# Patient Record
Sex: MALE | Race: WHITE | Employment: FULL TIME | URBAN - METROPOLITAN AREA
[De-identification: names, ages, dates, MRNs, and addresses within clinical notes are randomized per-mention and may not be internally consistent; named-entity substitution may affect disease eponyms.]

---

## 2017-07-31 ENCOUNTER — HOSPITAL ENCOUNTER (EMERGENCY)
Age: 57
Discharge: HOME | End: 2017-07-31
Attending: EMERGENCY MEDICINE | Admitting: EMERGENCY MEDICINE
Payer: COMMERCIAL

## 2017-07-31 ENCOUNTER — APPOINTMENT (OUTPATIENT)
Dept: ULTRASOUND IMAGING | Age: 57
End: 2017-07-31
Attending: EMERGENCY MEDICINE
Payer: COMMERCIAL

## 2017-07-31 ENCOUNTER — APPOINTMENT (OUTPATIENT)
Dept: CT IMAGING | Age: 57
End: 2017-07-31
Attending: EMERGENCY MEDICINE
Payer: COMMERCIAL

## 2017-07-31 VITALS
BODY MASS INDEX: 27.62 KG/M2 | HEIGHT: 72 IN | HEART RATE: 66 BPM | WEIGHT: 203.93 LBS | SYSTOLIC BLOOD PRESSURE: 157 MMHG | RESPIRATION RATE: 16 BRPM | DIASTOLIC BLOOD PRESSURE: 79 MMHG | OXYGEN SATURATION: 100 % | TEMPERATURE: 98.1 F

## 2017-07-31 DIAGNOSIS — R10.84 ABDOMINAL PAIN, GENERALIZED: Primary | ICD-10-CM

## 2017-07-31 LAB
ALBUMIN SERPL BCP-MCNC: 4 G/DL (ref 3.5–5)
ALBUMIN/GLOB SERPL: 1 {RATIO} (ref 1.1–2.2)
ALP SERPL-CCNC: 99 U/L (ref 45–117)
ALT SERPL-CCNC: 37 U/L (ref 12–78)
ANION GAP BLD CALC-SCNC: 4 MMOL/L (ref 5–15)
APPEARANCE UR: CLEAR
AST SERPL W P-5'-P-CCNC: 22 U/L (ref 15–37)
BACTERIA URNS QL MICRO: NEGATIVE /HPF
BASOPHILS # BLD AUTO: 0 K/UL (ref 0–0.1)
BASOPHILS # BLD: 0 % (ref 0–1)
BILIRUB SERPL-MCNC: 0.5 MG/DL (ref 0.2–1)
BILIRUB UR QL: NEGATIVE
BUN SERPL-MCNC: 14 MG/DL (ref 6–20)
BUN/CREAT SERPL: 15 (ref 12–20)
CALCIUM SERPL-MCNC: 8.9 MG/DL (ref 8.5–10.1)
CHLORIDE SERPL-SCNC: 103 MMOL/L (ref 97–108)
CO2 SERPL-SCNC: 29 MMOL/L (ref 21–32)
COLOR UR: ABNORMAL
CREAT SERPL-MCNC: 0.93 MG/DL (ref 0.7–1.3)
EOSINOPHIL # BLD: 0 K/UL (ref 0–0.4)
EOSINOPHIL NFR BLD: 0 % (ref 0–7)
EPITH CASTS URNS QL MICRO: ABNORMAL /LPF
ERYTHROCYTE [DISTWIDTH] IN BLOOD BY AUTOMATED COUNT: 12.8 % (ref 11.5–14.5)
GLOBULIN SER CALC-MCNC: 4.2 G/DL (ref 2–4)
GLUCOSE SERPL-MCNC: 93 MG/DL (ref 65–100)
GLUCOSE UR STRIP.AUTO-MCNC: NEGATIVE MG/DL
HCT VFR BLD AUTO: 45.1 % (ref 36.6–50.3)
HGB BLD-MCNC: 15.8 G/DL (ref 12.1–17)
HGB UR QL STRIP: ABNORMAL
HYALINE CASTS URNS QL MICRO: ABNORMAL /LPF (ref 0–5)
KETONES UR QL STRIP.AUTO: NEGATIVE MG/DL
LEUKOCYTE ESTERASE UR QL STRIP.AUTO: NEGATIVE
LIPASE SERPL-CCNC: 251 U/L (ref 73–393)
LYMPHOCYTES # BLD AUTO: 24 % (ref 12–49)
LYMPHOCYTES # BLD: 3 K/UL (ref 0.8–3.5)
MCH RBC QN AUTO: 33.1 PG (ref 26–34)
MCHC RBC AUTO-ENTMCNC: 35 G/DL (ref 30–36.5)
MCV RBC AUTO: 94.5 FL (ref 80–99)
MONOCYTES # BLD: 0.7 K/UL (ref 0–1)
MONOCYTES NFR BLD AUTO: 6 % (ref 5–13)
NEUTS SEG # BLD: 8.5 K/UL (ref 1.8–8)
NEUTS SEG NFR BLD AUTO: 70 % (ref 32–75)
NITRITE UR QL STRIP.AUTO: NEGATIVE
PH UR STRIP: 6 [PH] (ref 5–8)
PLATELET # BLD AUTO: 282 K/UL (ref 150–400)
POTASSIUM SERPL-SCNC: 3.9 MMOL/L (ref 3.5–5.1)
PROT SERPL-MCNC: 8.2 G/DL (ref 6.4–8.2)
PROT UR STRIP-MCNC: NEGATIVE MG/DL
RBC # BLD AUTO: 4.77 M/UL (ref 4.1–5.7)
RBC #/AREA URNS HPF: ABNORMAL /HPF (ref 0–5)
SODIUM SERPL-SCNC: 136 MMOL/L (ref 136–145)
SP GR UR REFRACTOMETRY: 1.02 (ref 1–1.03)
UA: UC IF INDICATED,UAUC: ABNORMAL
UROBILINOGEN UR QL STRIP.AUTO: 0.2 EU/DL (ref 0.2–1)
WBC # BLD AUTO: 12.3 K/UL (ref 4.1–11.1)
WBC URNS QL MICRO: ABNORMAL /HPF (ref 0–4)

## 2017-07-31 PROCEDURE — 81001 URINALYSIS AUTO W/SCOPE: CPT | Performed by: EMERGENCY MEDICINE

## 2017-07-31 PROCEDURE — 36415 COLL VENOUS BLD VENIPUNCTURE: CPT | Performed by: EMERGENCY MEDICINE

## 2017-07-31 PROCEDURE — 99284 EMERGENCY DEPT VISIT MOD MDM: CPT

## 2017-07-31 PROCEDURE — 80053 COMPREHEN METABOLIC PANEL: CPT | Performed by: EMERGENCY MEDICINE

## 2017-07-31 PROCEDURE — 76705 ECHO EXAM OF ABDOMEN: CPT

## 2017-07-31 PROCEDURE — 83690 ASSAY OF LIPASE: CPT | Performed by: EMERGENCY MEDICINE

## 2017-07-31 PROCEDURE — 74176 CT ABD & PELVIS W/O CONTRAST: CPT

## 2017-07-31 PROCEDURE — 85025 COMPLETE CBC W/AUTO DIFF WBC: CPT | Performed by: EMERGENCY MEDICINE

## 2017-07-31 RX ORDER — ESCITALOPRAM OXALATE 10 MG/1
10 TABLET ORAL DAILY
COMMUNITY

## 2017-07-31 RX ORDER — DIAZEPAM 5 MG/1
5 TABLET ORAL
Status: DISCONTINUED | OUTPATIENT
Start: 2017-07-31 | End: 2017-08-01 | Stop reason: HOSPADM

## 2017-07-31 NOTE — ED NOTES
Pt LUCIANO in wheelchair with transport for US. Reporting pain has increased, but wants to wait until he gets back from [address] before requesting something for pain.

## 2017-07-31 NOTE — ED NOTES
Pt received to exam room started to experience a 8/10 sharp, stabbing pain since this morning. Denies taking anything OTC to help with pain. Has had some associated nausea. Denies vomiting. Denies any changes in bowel or bladder habits.

## 2017-07-31 NOTE — ED NOTES
Pt resting in stretcher. Call bell within reach. Pt updated on plan of care    Reporting currently pain is tolerable and will ring out if pain increases. Awaiting US.

## 2017-08-01 NOTE — DISCHARGE INSTRUCTIONS

## 2017-08-01 NOTE — ED NOTES
Discharge instructions reviewed with pt and given by Dr. Ekaterina Naranjo. Pt left prior to receiving valium. Ambulated out of ED with steady gait, accompanied by wife. No other complaints voiced at this time.

## 2017-08-01 NOTE — ED NOTES
Pt returned to room from [address]. Pain continues to be tolerable.      Awaiting reeval.

## 2017-08-01 NOTE — ED PROVIDER NOTES
HPI Comments: Annette Deluca is a 55 y.o. male who presents ambulatory to [address] [facility] ED with cc of persistent RUQ abdominal pain since approximately 0900 this morning. Pt states that pain has been constant since onset, but waxes and wanes in intensity with some associated nausea and decreased appetite secondary to symptoms. He notes exacerbation of pain upon movement or palpation of the area. Pt denies any episodes of similar symptoms in the past and states that current abdominal pain feels different than that of prior kidney stone symptoms. Pt endorses daily EtOH use described as \"4-6 beers a day. \" He denies any prior abdominal surgeries and specifically denies any diarrhea, vomiting, chest pain, cough, or fever. Mahesh Silva MD    PMHx: kidney stones  Social Hx: + smoking; + daily EtOH; - illicit drug use    There are no other complains, changes, or physical findings at this time. The history is provided by the patient. No  was used. Past Medical History:   Diagnosis Date    Psychiatric disorder     anxiety       Past Surgical History:   Procedure Laterality Date    HX ORTHOPAEDIC      right knee    HX OTHER SURGICAL      vasectomy         History reviewed. No pertinent family history. Social History     Social History    Marital status:      Spouse name: N/A    Number of children: N/A    Years of education: N/A     Occupational History    Not on file. Social History Main Topics    Smoking status: Current Every Day Smoker     Packs/day: 1.00    Smokeless tobacco: Never Used    Alcohol use 16.8 - 24.6 oz/week     28 - 41 Cans of beer per week    Drug use: No    Sexual activity: Not on file     Other Topics Concern    Not on file     Social History Narrative         ALLERGIES: Review of patient's allergies indicates no known allergies. Review of Systems   Constitutional: Positive for appetite change. Negative for chills, fatigue and fever. HENT: Negative. Negative for congestion, rhinorrhea, sinus pressure and sore throat. Eyes: Negative. Respiratory: Negative. Negative for cough, choking, chest tightness, shortness of breath and wheezing. Cardiovascular: Negative. Negative for chest pain, palpitations and leg swelling. Gastrointestinal: Positive for abdominal pain and nausea. Negative for constipation, diarrhea and vomiting. Endocrine: Negative. Genitourinary: Negative. Negative for difficulty urinating, dysuria, flank pain and urgency. Musculoskeletal: Negative. Skin: Negative. Neurological: Negative. Negative for dizziness, speech difficulty, weakness, light-headedness, numbness and headaches. Psychiatric/Behavioral: Negative. All other systems reviewed and are negative. Vitals:    07/31/17 1614 07/31/17 1715 07/31/17 1918 07/31/17 1920   BP: 178/83 (!) 173/95 157/79    Pulse: 64 66     Resp: 17 16     Temp: 98.1 °F (36.7 °C)      SpO2: 99% 97%  100%   Weight: 92.5 kg (203 lb 14.8 oz)      Height: 6' (1.829 m)               Physical Exam   Constitutional: He is oriented to person, place, and time. He appears well-developed and well-nourished. No distress. HENT:   Head: Normocephalic and atraumatic. Mouth/Throat: Oropharynx is clear and moist. No oropharyngeal exudate. Eyes: Conjunctivae and EOM are normal. Pupils are equal, round, and reactive to light. Neck: Normal range of motion. Neck supple. No JVD present. No tracheal deviation present. Cardiovascular: Normal rate, regular rhythm, normal heart sounds and intact distal pulses. No murmur heard. Pulmonary/Chest: Effort normal and breath sounds normal. No stridor. No respiratory distress. He has no wheezes. He has no rales. He exhibits no tenderness. Abdominal: Soft. He exhibits no distension. There is tenderness (right upper and mid abdomen, neg McBurney's). There is no rebound and no guarding. Musculoskeletal: Normal range of motion.  He exhibits no edema or tenderness. Neurological: He is alert and oriented to person, place, and time. No cranial nerve deficit. No gross motor or sensory deficits    Skin: Skin is warm and dry. He is not diaphoretic. Psychiatric: He has a normal mood and affect. His behavior is normal.   Nursing note and vitals reviewed.        MDM  Number of Diagnoses or Management Options  Diagnosis management comments: DDx: cholelithiasis, cholecystitis, choledocholithiasis, pancreatitis       Amount and/or Complexity of Data Reviewed  Clinical lab tests: ordered and reviewed  Tests in the radiology section of CPT®: ordered and reviewed  Review and summarize past medical records: yes    Patient Progress  Patient progress: stable    ED Course       Procedures    LABORATORY TESTS:  Recent Results (from the past 12 hour(s))   URINALYSIS W/ REFLEX CULTURE    Collection Time: 07/31/17  4:20 PM   Result Value Ref Range    Color YELLOW/STRAW      Appearance CLEAR CLEAR      Specific gravity 1.024 1.003 - 1.030      pH (UA) 6.0 5.0 - 8.0      Protein NEGATIVE  NEG mg/dL    Glucose NEGATIVE  NEG mg/dL    Ketone NEGATIVE  NEG mg/dL    Bilirubin NEGATIVE  NEG      Blood MODERATE (A) NEG      Urobilinogen 0.2 0.2 - 1.0 EU/dL    Nitrites NEGATIVE  NEG      Leukocyte Esterase NEGATIVE  NEG      WBC 0-4 0 - 4 /hpf    RBC 10-20 0 - 5 /hpf    Epithelial cells FEW FEW /lpf    Bacteria NEGATIVE  NEG /hpf    UA:UC IF INDICATED CULTURE NOT INDICATED BY UA RESULT CNI      Hyaline cast 0-2 0 - 5 /lpf   CBC WITH AUTOMATED DIFF    Collection Time: 07/31/17  5:57 PM   Result Value Ref Range    WBC 12.3 (H) 4.1 - 11.1 K/uL    RBC 4.77 4.10 - 5.70 M/uL    HGB 15.8 12.1 - 17.0 g/dL    HCT 45.1 36.6 - 50.3 %    MCV 94.5 80.0 - 99.0 FL    MCH 33.1 26.0 - 34.0 PG    MCHC 35.0 30.0 - 36.5 g/dL    RDW 12.8 11.5 - 14.5 %    PLATELET 721 024 - 493 K/uL    NEUTROPHILS 70 32 - 75 %    LYMPHOCYTES 24 12 - 49 %    MONOCYTES 6 5 - 13 %    EOSINOPHILS 0 0 - 7 % BASOPHILS 0 0 - 1 %    ABS. NEUTROPHILS 8.5 (H) 1.8 - 8.0 K/UL    ABS. LYMPHOCYTES 3.0 0.8 - 3.5 K/UL    ABS. MONOCYTES 0.7 0.0 - 1.0 K/UL    ABS. EOSINOPHILS 0.0 0.0 - 0.4 K/UL    ABS. BASOPHILS 0.0 0.0 - 0.1 K/UL   METABOLIC PANEL, COMPREHENSIVE    Collection Time: 17  5:57 PM   Result Value Ref Range    Sodium 136 136 - 145 mmol/L    Potassium 3.9 3.5 - 5.1 mmol/L    Chloride 103 97 - 108 mmol/L    CO2 29 21 - 32 mmol/L    Anion gap 4 (L) 5 - 15 mmol/L    Glucose 93 65 - 100 mg/dL    BUN 14 6 - 20 MG/DL    Creatinine 0.93 0.70 - 1.30 MG/DL    BUN/Creatinine ratio 15 12 - 20      GFR est AA >60 >60 ml/min/1.73m2    GFR est non-AA >60 >60 ml/min/1.73m2    Calcium 8.9 8.5 - 10.1 MG/DL    Bilirubin, total 0.5 0.2 - 1.0 MG/DL    ALT (SGPT) 37 12 - 78 U/L    AST (SGOT) 22 15 - 37 U/L    Alk. phosphatase 99 45 - 117 U/L    Protein, total 8.2 6.4 - 8.2 g/dL    Albumin 4.0 3.5 - 5.0 g/dL    Globulin 4.2 (H) 2.0 - 4.0 g/dL    A-G Ratio 1.0 (L) 1.1 - 2.2     LIPASE    Collection Time: 17  5:57 PM   Result Value Ref Range    Lipase 251 73 - 393 U/L       IMAGING RESULTS:  CT Results  (Last 48 hours)               17  CT ABD PELV WO CONT Final result    Narrative:  EXAM:  CT ABD PELV WO CONT       INDICATION:  Right flank, hematuria       COMPARISON: 2013. CONTRAST:  None. TECHNIQUE:    Unenhanced multislice helical CT was performed from the diaphragm to the   symphysis pubis without oral or intravenous contrast administration. Contiguous   5 mm axial images were reconstructed and lung and soft tissue windows were   generated. Coronal and sagittal reformations were generated. CT dose reduction   was achieved through use of a standardized protocol tailored for this   examination and automatic exposure control for dose modulation. FINDINGS:   LUN.7 mm nodule noted in the base of the right middle lobe unchanged.  Second   nodule in the right middle lobe more peripherally measuring 4.1 mm unchanged. Small nodule in the right lower lobe measures 4.0 mm. This is unchanged as   well. .       The absence of intravenous contrast material reduces the sensitivity for   evaluation of the solid parenchymal organs of the abdomen. LIVER: No focal lesion. GALLBLADDER: Unremarkable. SPLEEN: No focal lesion. PANCREAS: No focal lesion. ADRENALS: No focal lesion. KIDNEYS: No calculus. No hydronephrosis. GI: There is no evidence of bowel obstruction. Diverticulosis of the distal   colon is noted without diverticulitis. .   APPENDIX: Unremarkable. AORTA: The aorta tapers without aneurysm. RETROPERITONEUM:  There is no retroperitoneal adenopathy or mass. PERITONEUM: There is no ascites or free intraperitoneal air. BLADDER: Unremarkable   PELVIS: There is no pelvic mass or adenopathy. BONES: No sclerotic or lytic lesions noted. IMPRESSION:     1. No evidence of urinary tract calculus or obstruction. 2. Diverticulosis without diverticulitis. 3. No significant change in pulmonary nodules at the right lung base. VITALS:  Patient Vitals for the past 12 hrs:   Temp Pulse Resp BP SpO2   07/31/17 1920 - - - - 100 %   07/31/17 1918 - - - 157/79 -   07/31/17 1715 - 66 16 (!) 173/95 97 %   07/31/17 1614 98.1 °F (36.7 °C) 64 17 178/83 99 %       MEDICATIONS GIVEN:  Medications   diazePAM (VALIUM) tablet 5 mg (not administered)       IMPRESSION:  1. Abdominal pain, generalized        PLAN:  1. Current Discharge Medication List        2. Follow-up Information     Follow up With Details Comments [address] [facility] [place]   [address] [facility]  [phone]      [facility] EMERGENCY DEPT  If symptoms worsen [address] [facility]  [address] [facility]  [phone]        3. Return to ED if worse     Discharge Note:  10:53 PM  The patient has been re-evaluated and is ready for discharge. Reviewed available results with patient. Counseled patient on diagnosis and care plan. Patient has expressed understanding, and all questions have been answered. Patient agrees with plan and agrees to follow up as recommended, or to return to the ED if their symptoms worsen. Discharge instructions have been provided and explained to the patient, along with reasons to return to the ED. This note is prepared by Aldair Omer, acting as Scribe for [facility], [address] [place] [address]: The scribe's documentation has been prepared under my direction and personally reviewed by me in its entirety. I confirm that the note above accurately reflects all work, treatment, procedures, and medical decision making performed by me.

## 2022-01-13 ENCOUNTER — OFFICE VISIT (OUTPATIENT)
Dept: ORTHOPEDIC SURGERY | Age: 62
End: 2022-01-13
Payer: COMMERCIAL

## 2022-01-13 VITALS
HEIGHT: 72 IN | SYSTOLIC BLOOD PRESSURE: 143 MMHG | OXYGEN SATURATION: 97 % | TEMPERATURE: 98 F | HEART RATE: 71 BPM | DIASTOLIC BLOOD PRESSURE: 95 MMHG | WEIGHT: 204 LBS | BODY MASS INDEX: 27.63 KG/M2

## 2022-01-13 DIAGNOSIS — M16.12 UNILATERAL PRIMARY OSTEOARTHRITIS, LEFT HIP: Primary | ICD-10-CM

## 2022-01-13 DIAGNOSIS — M70.62 TROCHANTERIC BURSITIS, LEFT HIP: ICD-10-CM

## 2022-01-13 DIAGNOSIS — M54.16 LUMBAR RADICULOPATHY: ICD-10-CM

## 2022-01-13 PROCEDURE — 99204 OFFICE O/P NEW MOD 45 MIN: CPT | Performed by: ORTHOPAEDIC SURGERY

## 2022-01-13 PROCEDURE — 20610 DRAIN/INJ JOINT/BURSA W/O US: CPT | Performed by: ORTHOPAEDIC SURGERY

## 2022-01-13 RX ORDER — BETAMETHASONE SODIUM PHOSPHATE AND BETAMETHASONE ACETATE 3; 3 MG/ML; MG/ML
6 INJECTION, SUSPENSION INTRA-ARTICULAR; INTRALESIONAL; INTRAMUSCULAR; SOFT TISSUE ONCE
Status: COMPLETED | OUTPATIENT
Start: 2022-01-13 | End: 2022-01-13

## 2022-01-13 RX ADMIN — BETAMETHASONE SODIUM PHOSPHATE AND BETAMETHASONE ACETATE 6 MG: 3; 3 INJECTION, SUSPENSION INTRA-ARTICULAR; INTRALESIONAL; INTRAMUSCULAR; SOFT TISSUE at 12:08

## 2022-01-13 NOTE — LETTER
1/13/2022    Patient: Ariel Mendoza   YOB: 1960   Date of Visit: 1/13/2022     Lyndon Christopher, [address]  [address] [facility] [address]  Via Fax: [phone]    Dear Lyndon Christopher MD,      Thank you for referring Mr. Tamia Jo to [facility] for evaluation. My notes for this consultation are attached. If you have questions, please do not hesitate to call me. I look forward to following your patient along with you.       Sincerely,    Liset Byrd, DO

## 2022-01-13 NOTE — PROGRESS NOTES
1/13/2022    Assessment: Peritrochanteric pain syndrome, left hip, Osteoarthritis left hip, lumbar radiculopathy, left side    Plan: This patient and I did discuss at length the anatomy, which I drew out in office. We discussed the potential causes of the issue, as well as the treatment options, which are largely nonoperative. We discussed that a mixture of anti-inflammatory analgesics, cortisone injections, as well as consistent physical therapy for 4-6 weeks is the standard of care of this issue. Evidence indicates that over 90% of patients can begin to resolve their issues in that time. The patient will proceed with injections, PT, nsaids. When he returns, we will proceed with possible hip injection into the joint. The patient will then follow up in one weeks for a clinical check. Chief Complaint: Left hip pain    HPI: This is a 62 y.o. patient who complains of left hip pain which is activity dependent. The patient has had activity dependent pain for several months. The pain is located in the lateral aspect of the hip, it radiates somewhat distally and laterally, it is severe in intensity. He has a burning in his calf and bilateral feet. The patient complains of difficulty sleeping on the left side, limitation in the normal activities of daily living. The patient has tried activity modification, No over the counter pain medications, no physical therapy, injections have not been attempted. No other surgery or pertinent history to this hip. Past Medical History:   Diagnosis Date    Kidney stone     Psychiatric disorder     anxiety       Past Surgical History:   Procedure Laterality Date    HX ORTHOPAEDIC      right knee    HX OTHER SURGICAL      vasectomy       Current Outpatient Medications on File Prior to Visit   Medication Sig Dispense Refill    escitalopram oxalate (LEXAPRO) 10 mg tablet Take 10 mg by mouth daily.        No current facility-administered medications on file prior to visit. No Known Allergies    No family history on file. Social History     Socioeconomic History    Marital status:    Tobacco Use    Smoking status: Current Every Day Smoker     Packs/day: 1.00    Smokeless tobacco: Never Used   Substance and Sexual Activity    Alcohol use: Yes     Alcohol/week: 28.0 - 41.0 standard drinks     Types: 28 - 41 Cans of beer per week    Drug use: No         Review of Systems:       General: Denies headache, lethargy, fever, weight loss  Ears/Nose/Throat: Denies ear discharge, drainage, nosebleeds, hoarse voice, dental problems  Cardiovascular: Denies chest pain, shortness of breath  Lungs: Denies chest pain, breathing problems, wheezing, pneumonia  Stomach: Denies stomach pain, heartburn, constipation, irritable bowel  Skin: Denies rash, sores, open wounds  Musculoskeletal: Admits to lateral hip pain  Genitourinary: Denies dysuria, hematuria, polyuria  Gastrointestinal: Denies constipation, obstipation, diarrhea  Neurological: Denies changes in sight, smell, hearing, taste, seizures. Denies loss of consciousness.   Psychiatric: Denies depression, sleep pattern changes, anxiety, change in personality  Endocrine: Denies mood swings, heat or cold intolerance  Hematologic/Lymphatic: Denies anemia, purpura, petechia  Allergic/Immunologic: Denies swelling of throat, pain or swelling at lymph nodes      Physical Examination:    Visit Vitals  BP (!) 143/95 (BP 1 Location: Right arm, BP Patient Position: Sitting, BP Cuff Size: Large adult)   Pulse 71   Temp 98 °F (36.7 °C) (Tympanic)   Ht 6' (1.829 m)   Wt 204 lb (92.5 kg)   SpO2 97%   BMI 27.67 kg/m²        General: AOX3, no apparent distress  Psychiatric: mood and affect appropriate  Lungs: breathing is symmetric and unlabored bilaterally  Heart: regular rate and rhythm  Abdomen: no guarding  Head: normocephalic, atraumatic  Skin: No significant abnormalities, good turgor  Sensation intact to light touch: L1-S1 dermatomes  Muscular exam: 5/5 strength in all major muscle groups unless noted in specialty exam.    Extremities      Left upper extremity: Full active and passive range of motion without pain, deformity, no open wound, strength 5/5 in all major muscle groups. Right upper extremity: Full active and passive range of motion without pain, deformity, no open wound, strength 5/5 in all major muscle groups. Right lower extremity: Full active and passive range of motion without pain, deformity, no open wound, strength 5/5 in all major muscle groups. Left lower extremity:  No deformity is noted. Circumduction of the hip causes significant pain in the groin. Palpation of the abductors as well as lateral aspect of the hip at the peritrochanteric bursa region is painful and reproductive of the chief complaint. Range of motion is limited, with a positive impingement sign. ROMAN test is positive. Gait is reciprocal.   Sensation is intact to light touch in the L1-S1 dermatomes. Skin is intact about the hip. Hip flexion strength is 5/5 and abduction strength is 5/5, hip extension and knee extension as well as tibialis anterior and EHL/GCS are 5/5 strength. Diagnostics:    Pertinent Xrays:  Pelvis and hip xrays indicate no fractures, bone on bone OA of left hip, DJD spine, calcifcations at the tip of the greater trochanter at the insertion of the gluteus tendons. Mr. Eloise Webster has a reminder for a \"due or due soon\" health maintenance. I have asked that he contact his primary care provider for follow-up on this health maintenance. 1/13/2022  PROCEDURE NOTE: Left Hip Peritrochanteric Cortisone Injection    After consent was signed, the patient's left hip was prepped using a chlorhexidine scrub.   Once  sterile, a timeout was performed and a 22 gauge needle was used to inject 5cc of 1% lidocaine through the subcutaneous tissues and iliotibial band in the peritrochanteric area nearest the apex of tenderness. Once adequate anesthesia was confirmed, a mixture of 6mg of betamethasone and 5 cc of 1% lidocaine were injected below the iliotibial band in the same area. Needle was removed and a sterile dressing applied. Patient tolerated well without complication. Post injection instructions were given.

## 2022-01-13 NOTE — PROGRESS NOTES
Identified pt with two pt identifiers (name and ). Reviewed chart in preparation for visit and have obtained necessary documentation. Marjorie Abbott is a 62 y.o. male  Chief Complaint   Patient presents with    Hip Pain     LT hip     Visit Vitals  BP (!) 143/95 (BP 1 Location: Right arm, BP Patient Position: Sitting, BP Cuff Size: Large adult)   Pulse 71   Temp 98 °F (36.7 °C) (Tympanic)   Ht 6' (1.829 m)   Wt 204 lb (92.5 kg)   SpO2 97%   BMI 27.67 kg/m²     1. Have you been to the ER, urgent care clinic since your last visit? Hospitalized since your last visit? No    2. Have you seen or consulted any other health care providers outside of the [address] [place] [address] since your last visit? Include any pap smears or colon screening.  No

## 2022-01-20 ENCOUNTER — OFFICE VISIT (OUTPATIENT)
Dept: ORTHOPEDIC SURGERY | Age: 62
End: 2022-01-20
Payer: COMMERCIAL

## 2022-01-20 VITALS
DIASTOLIC BLOOD PRESSURE: 100 MMHG | HEIGHT: 72 IN | BODY MASS INDEX: 27.63 KG/M2 | OXYGEN SATURATION: 97 % | SYSTOLIC BLOOD PRESSURE: 153 MMHG | WEIGHT: 204 LBS | TEMPERATURE: 98.9 F | HEART RATE: 84 BPM

## 2022-01-20 DIAGNOSIS — M16.12 UNILATERAL PRIMARY OSTEOARTHRITIS, LEFT HIP: Primary | ICD-10-CM

## 2022-01-20 PROCEDURE — 99213 OFFICE O/P EST LOW 20 MIN: CPT | Performed by: ORTHOPAEDIC SURGERY

## 2022-01-20 NOTE — PROGRESS NOTES
Identified pt with two pt identifiers (name and ). Reviewed chart in preparation for visit and have obtained necessary documentation. Olinda Gary is a 62 y.o. male  Chief Complaint   Patient presents with    Follow-up     LT hip     Visit Vitals  BP (!) 153/100 (BP 1 Location: Right arm, BP Patient Position: Sitting, BP Cuff Size: Large adult)   Pulse 84   Temp 98.9 °F (37.2 °C) (Tympanic)   Ht 6' (1.829 m)   Wt 204 lb (92.5 kg)   SpO2 97%   BMI 27.67 kg/m²     1. Have you been to the ER, urgent care clinic since your last visit? Hospitalized since your last visit? No    2. Have you seen or consulted any other health care providers outside of the [address] [place] [address] since your last visit? Include any pap smears or colon screening.  No

## 2022-01-20 NOTE — PROGRESS NOTES
1/20/2022    Chief Complaint: Left hip pain    Assessment: Unilateral primary osteoarthritis of the left hip  (M16.12)     Plan: This patient and I did have a long discussion regarding the treatment options. Nonoperative management was discussed at length, continued pain control, physical therapy, injections, ambulatory aids, and weight loss. I did speak with the patient specifically that there are always some nonoperative options, and that surgery would be elective on their part. We did discuss the risks of surgery which include but are not limited to infection, nerve or blood vessel damage, femoral, lateral femoral cutaneous nerve injury, sciatic nerve injury, failure of fixation, failure of any possible implant, need for reoperation, postoperative pain and swelling, intra-or postoperative fracture, postoperative dislocation, leg length inequality, need for reoperation, implant failure, death, disability, organ dysfunction, wound healing issues, DVT, PE, and the need for further procedures. The patient did freely state their understanding and satisfaction with our discussion. We will proceed with a total hip arthroplasty after he talks with his family and medical clearances. He is aware that he does have a spine issue as well, this may be a combined issue that will require intervention after surgery. The patient was counseled at length about the risks of jayne Covid-19 during their perioperative period and any recovery window from their procedure. The patient was made aware that jayne Covid-19  may worsen their prognosis for recovering from their procedure and lend to a higher morbidity and/or mortality risk. All material risks, benefits, and reasonable alternatives including postponing the procedure were discussed. The patient does  wish to proceed with the procedure at this time. HPI: This is a 62 y.o.male who complains of left hip pain which is activity dependent.  The patient has had activity dependent pain for years. The patient has tried activity modification, he's tried physical therapy, injections have given minimal relief. The pain is in the groin, it is severe in intensity. The patient fears falling, walks with a limp, and feels as though all nonoperative management has failed. The patient denies any numbness, weakness, tingling, fevers, chills, nausea, vomiting, fevers, chills, nausea, vomiting. Past Medical History:   Diagnosis Date    Kidney stone     Psychiatric disorder     anxiety       Past Surgical History:   Procedure Laterality Date    HX ORTHOPAEDIC      right knee    HX OTHER SURGICAL      vasectomy       Current Outpatient Medications on File Prior to Visit   Medication Sig Dispense Refill    escitalopram oxalate (LEXAPRO) 10 mg tablet Take 10 mg by mouth daily. No current facility-administered medications on file prior to visit. No Known Allergies    No family history on file. Social History     Socioeconomic History    Marital status:    Tobacco Use    Smoking status: Current Every Day Smoker     Packs/day: 1.00    Smokeless tobacco: Never Used   Substance and Sexual Activity    Alcohol use: Yes     Alcohol/week: 28.0 - 41.0 standard drinks     Types: 28 - 41 Cans of beer per week    Drug use: No         Review of Systems:       General: Denies headache, lethargy, fever, weight loss  Ears/Nose/Throat: Denies ear discharge, drainage, nosebleeds, hoarse voice, dental problems  Cardiovascular: Denies chest pain, shortness of breath  Lungs: Denies chest pain, breathing problems, wheezing, pneumonia  Stomach: Denies stomach pain, heartburn, constipation, irritable bowel  Skin: Denies rash, sores, open wounds  Musculoskeletal: Admits to joint pain and swelling, this pain is sharp and causes difficulty walking.   This pain is in the groin on the left side, it is severe, made better by rest.   This pain causes a limp, admits to stiffness of the joint, decreased mobility, and  difficulty doing normal activities of daily living secondary to the pain. Genitourinary: Denies dysuria, hematuria, polyuria  Gastrointestinal: Denies constipation, obstipation, diarrhea  Neurological: Denies changes in sight, smell, hearing, taste, seizures. Denies loss of consciousness. Psychiatric: Denies depression, sleep pattern changes, anxiety, change in personality  Endocrine: Denies mood swings, heat or cold intolerance  Hematologic/Lymphatic: Denies anemia, purpura, petechia  Allergic/Immunologic: Denies swelling of throat, pain or swelling at lymph nodes      Physical Examination:      General: AOX3, no apparent distress  Psychiatric: mood and affect appropriate  Lungs: clear to auscultation bilaterally  Heart: regular rate and rhythm  Abdomen: no guarding  Head: normocephalic, atraumatic  Skin: No significant abnormalities, good turgor  Sensation intact to light touch: L1-S1 dermatomes  Muscular exam: 5/5 strength in all major muscle groups unless noted in specialty exam.    Extremities      Left upper extremity: Full active and passive range of motion without pain, deformity, no open wound, strength 5/5 in all major muscle groups. Right upper extremity: Full active and passive range of motion without pain, deformity, no open wound, strength 5/5 in all major muscle groups. Right lower extremity: Full active and passive range of motion without pain, deformity, no open wound, strength 5/5 in all major muscle groups. Left lower extremity:  No deformity is noted. Circumduction of the hip causes significant pain in the groin, reproductive of the chief complaint. Range of motion is limited, with a positive impingement sign. ROMAN test causes some pain in the groin. Gait is antalgic. Slight tenderness to palpation on the lateral aspect of the hip. Sensation is intact to light touch in the L1-S1 dermatomes. Skin is intact about the hip.   Hip flexion and abduction strength is 4+/5, hip extension and knee extension as well as tibialis anterior and EHL/GCS are 5/5 strength. Diagnostics:    Pertinent Xrays:  Pelvis and hip xrays indicate severe osteoarthritis of the left hip. There are osteophytes at the femoral neck and head, as well as subchondral sclerosis of the acetabular rim. Mr. Mendoza has a reminder for a \"due or due soon\" health maintenance. I have asked that he contact his primary care provider for follow-up on this health maintenance.

## 2022-02-09 ENCOUNTER — OFFICE VISIT (OUTPATIENT)
Dept: ORTHOPEDIC SURGERY | Age: 62
End: 2022-02-09
Payer: COMMERCIAL

## 2022-02-09 VITALS
HEIGHT: 72 IN | SYSTOLIC BLOOD PRESSURE: 171 MMHG | OXYGEN SATURATION: 97 % | HEART RATE: 75 BPM | DIASTOLIC BLOOD PRESSURE: 107 MMHG | WEIGHT: 201 LBS | BODY MASS INDEX: 27.22 KG/M2 | TEMPERATURE: 98.1 F

## 2022-02-09 DIAGNOSIS — M16.12 UNILATERAL PRIMARY OSTEOARTHRITIS, LEFT HIP: Primary | ICD-10-CM

## 2022-02-09 PROCEDURE — 99213 OFFICE O/P EST LOW 20 MIN: CPT | Performed by: ORTHOPAEDIC SURGERY

## 2022-02-09 NOTE — PROGRESS NOTES
2/9/2022    Chief Complaint: Left hip pain    Assessment: Unilateral primary osteoarthritis of the left hip  (M16.12)     Plan: This patient and I did have a long discussion regarding the treatment options. Nonoperative management was discussed at length, continued pain control, physical therapy, injections, ambulatory aids, and weight loss. I did speak with the patient specifically that there are always some nonoperative options, and that surgery would be elective on their part. We did discuss the risks of surgery which include but are not limited to infection, nerve or blood vessel damage, femoral, lateral femoral cutaneous nerve injury, sciatic nerve injury, failure of fixation, failure of any possible implant, need for reoperation, postoperative pain and swelling, intra-or postoperative fracture, postoperative dislocation, leg length inequality, need for reoperation, implant failure, death, disability, organ dysfunction, wound healing issues, DVT, PE, and the need for further procedures. The patient did freely state their understanding and satisfaction with our discussion. We will proceed with a total hip arthroplasty after  medical clearances. The patient was counseled at length about the risks of jayne Covid-19 during their perioperative period and any recovery window from their procedure. The patient was made aware that jayne Covid-19  may worsen their prognosis for recovering from their procedure and lend to a higher morbidity and/or mortality risk. All material risks, benefits, and reasonable alternatives including postponing the procedure were discussed. The patient does  wish to proceed with the procedure at this time. HPI: This is a 62 y.o.male who complains of left hip pain which is activity dependent. The patient has had activity dependent pain for years. The patient has tried activity modification,  physical therapy, injections have not been attempted.   The pain is in the groin and distally, it is severe in intensity. The patient fears falling, walks with a limp, and feels as though all nonoperative management has failed. The patient denies any numbness, weakness, tingling, fevers, chills, nausea, vomiting, fevers, chills, nausea, vomiting. Past Medical History:   Diagnosis Date    Kidney stone     Psychiatric disorder     anxiety       Past Surgical History:   Procedure Laterality Date    HX ORTHOPAEDIC      right knee    HX OTHER SURGICAL      vasectomy       Current Outpatient Medications on File Prior to Visit   Medication Sig Dispense Refill    escitalopram oxalate (LEXAPRO) 10 mg tablet Take 10 mg by mouth daily. No current facility-administered medications on file prior to visit. No Known Allergies    No family history on file. Social History     Socioeconomic History    Marital status:    Tobacco Use    Smoking status: Current Every Day Smoker     Packs/day: 1.00    Smokeless tobacco: Never Used   Substance and Sexual Activity    Alcohol use: Yes     Alcohol/week: 28.0 - 41.0 standard drinks     Types: 28 - 41 Cans of beer per week    Drug use: No         Review of Systems:       General: Denies headache, lethargy, fever, weight loss  Ears/Nose/Throat: Denies ear discharge, drainage, nosebleeds, hoarse voice, dental problems  Cardiovascular: Denies chest pain, shortness of breath  Lungs: Denies chest pain, breathing problems, wheezing, pneumonia  Stomach: Denies stomach pain, heartburn, constipation, irritable bowel  Skin: Denies rash, sores, open wounds  Musculoskeletal: Admits to joint pain and swelling, this pain is sharp and causes difficulty walking. This pain is in the groin on the left side, it is severe, made better by rest.   This pain causes a limp, admits to stiffness of the joint, decreased mobility, and  difficulty doing normal activities of daily living secondary to the pain.   Genitourinary: Denies dysuria, hematuria, polyuria  Gastrointestinal: Denies constipation, obstipation, diarrhea  Neurological: Denies changes in sight, smell, hearing, taste, seizures. Denies loss of consciousness. Psychiatric: Denies depression, sleep pattern changes, anxiety, change in personality  Endocrine: Denies mood swings, heat or cold intolerance  Hematologic/Lymphatic: Denies anemia, purpura, petechia  Allergic/Immunologic: Denies swelling of throat, pain or swelling at lymph nodes      Physical Examination:      General: AOX3, no apparent distress  Psychiatric: mood and affect appropriate  Lungs: clear to auscultation bilaterally  Heart: regular rate and rhythm  Abdomen: no guarding  Head: normocephalic, atraumatic  Skin: No significant abnormalities, good turgor  Sensation intact to light touch: L1-S1 dermatomes  Muscular exam: 5/5 strength in all major muscle groups unless noted in specialty exam.    Extremities      Left upper extremity: Full active and passive range of motion without pain, deformity, no open wound, strength 5/5 in all major muscle groups. Right upper extremity: Full active and passive range of motion without pain, deformity, no open wound, strength 5/5 in all major muscle groups. Right lower extremity: Full active and passive range of motion without pain, deformity, no open wound, strength 5/5 in all major muscle groups. Left lower extremity:  No deformity is noted. Circumduction of the hip causes significant pain in the groin, reproductive of the chief complaint. Range of motion is limited, with a positive impingement sign. ROMAN test causes some pain in the groin. Gait is antalgic. Slight tenderness to palpation on the lateral aspect of the hip. Sensation is intact to light touch in the L1-S1 dermatomes. Skin is intact about the hip. Hip flexion and abduction strength is 4+/5, hip extension and knee extension as well as tibialis anterior and EHL/GCS are 5/5 strength.     Diagnostics:    Pertinent Xrays: Pelvis and hip xrays indicate severe osteoarthritis of the left hip. There are osteophytes at the femoral neck and head, as well as subchondral sclerosis of the acetabular rim. Mr. Mendoza has a reminder for a \"due or due soon\" health maintenance. I have asked that he contact his primary care provider for follow-up on this health maintenance.

## 2022-02-09 NOTE — PROGRESS NOTES
Identified pt with two pt identifiers (name and ). Reviewed chart in preparation for visit and have obtained necessary documentation. Sarah Hogan is a 62 y.o. male  Chief Complaint   Patient presents with    Follow-up     LT hip     Visit Vitals  BP (!) 171/107 (BP 1 Location: Right arm, BP Patient Position: Sitting, BP Cuff Size: Large adult)   Pulse 75   Temp 98.1 °F (36.7 °C) (Tympanic)   Ht 6' (1.829 m)   Wt 201 lb (91.2 kg)   SpO2 97%   BMI 27.26 kg/m²     1. Have you been to the ER, urgent care clinic since your last visit? Hospitalized since your last visit? No    2. Have you seen or consulted any other health care providers outside of the [address] [place] [address] since your last visit? Include any pap smears or colon screening.  No

## 2022-02-09 NOTE — LETTER
2/9/2022    Patient: Maine Mendoza   YOB: 1960   Date of Visit: 2/9/2022     [address] [place] [id number]  Via Fax: [phone]    Dear Chivo Ryder MD,      Thank you for referring Mr. Yun Dominique to [facility] for evaluation. My notes for this consultation are attached. If you have questions, please do not hesitate to call me. I look forward to following your patient along with you.       Sincerely,    Erin Wild, DO

## 2022-03-01 ENCOUNTER — HOSPITAL ENCOUNTER (OUTPATIENT)
Dept: PREADMISSION TESTING | Age: 62
Discharge: HOME | End: 2022-03-01
Attending: ORTHOPAEDIC SURGERY
Payer: COMMERCIAL

## 2022-03-01 VITALS
WEIGHT: 202.38 LBS | HEIGHT: 72 IN | TEMPERATURE: 98 F | HEART RATE: 78 BPM | SYSTOLIC BLOOD PRESSURE: 155 MMHG | OXYGEN SATURATION: 99 % | DIASTOLIC BLOOD PRESSURE: 89 MMHG | RESPIRATION RATE: 20 BRPM | BODY MASS INDEX: 27.41 KG/M2

## 2022-03-01 LAB
ABO + RH BLD: NORMAL
ALBUMIN SERPL-MCNC: 3.5 G/DL (ref 3.5–5)
ALBUMIN/GLOB SERPL: 0.8 {RATIO} (ref 1.1–2.2)
ALP SERPL-CCNC: 87 U/L (ref 45–117)
ALT SERPL-CCNC: 36 U/L (ref 12–78)
ANION GAP SERPL CALC-SCNC: 5 MMOL/L (ref 5–15)
APPEARANCE UR: CLEAR
AST SERPL-CCNC: 22 U/L (ref 15–37)
BACTERIA URNS QL MICRO: NEGATIVE /HPF
BILIRUB SERPL-MCNC: 0.5 MG/DL (ref 0.2–1)
BILIRUB UR QL: NEGATIVE
BLOOD GROUP ANTIBODIES SERPL: NORMAL
BUN SERPL-MCNC: 13 MG/DL (ref 6–20)
BUN/CREAT SERPL: 15 (ref 12–20)
CALCIUM SERPL-MCNC: 9.3 MG/DL (ref 8.5–10.1)
CHLORIDE SERPL-SCNC: 107 MMOL/L (ref 97–108)
CO2 SERPL-SCNC: 25 MMOL/L (ref 21–32)
COLOR UR: NORMAL
CREAT SERPL-MCNC: 0.87 MG/DL (ref 0.7–1.3)
EPITH CASTS URNS QL MICRO: NORMAL /LPF
ERYTHROCYTE [DISTWIDTH] IN BLOOD BY AUTOMATED COUNT: 12.8 % (ref 11.5–14.5)
EST. AVERAGE GLUCOSE BLD GHB EST-MCNC: 103 MG/DL
GLOBULIN SER CALC-MCNC: 4.5 G/DL (ref 2–4)
GLUCOSE SERPL-MCNC: 117 MG/DL (ref 65–100)
GLUCOSE UR STRIP.AUTO-MCNC: NEGATIVE MG/DL
HBA1C MFR BLD: 5.2 % (ref 4–5.6)
HCT VFR BLD AUTO: 45.8 % (ref 36.6–50.3)
HGB BLD-MCNC: 15.8 G/DL (ref 12.1–17)
HGB UR QL STRIP: NEGATIVE
HYALINE CASTS URNS QL MICRO: NORMAL /LPF (ref 0–5)
INR PPP: 1 (ref 0.9–1.1)
KETONES UR QL STRIP.AUTO: NEGATIVE MG/DL
LEUKOCYTE ESTERASE UR QL STRIP.AUTO: NEGATIVE
MCH RBC QN AUTO: 32.8 PG (ref 26–34)
MCHC RBC AUTO-ENTMCNC: 34.5 G/DL (ref 30–36.5)
MCV RBC AUTO: 95 FL (ref 80–99)
NITRITE UR QL STRIP.AUTO: NEGATIVE
NRBC # BLD: 0 K/UL (ref 0–0.01)
NRBC BLD-RTO: 0 PER 100 WBC
PH UR STRIP: 6 [PH] (ref 5–8)
PLATELET # BLD AUTO: 250 K/UL (ref 150–400)
PMV BLD AUTO: 8.3 FL (ref 8.9–12.9)
POTASSIUM SERPL-SCNC: 4.3 MMOL/L (ref 3.5–5.1)
PROT SERPL-MCNC: 8 G/DL (ref 6.4–8.2)
PROT UR STRIP-MCNC: NEGATIVE MG/DL
PROTHROMBIN TIME: 10.2 SEC (ref 9–11.1)
RBC # BLD AUTO: 4.82 M/UL (ref 4.1–5.7)
RBC #/AREA URNS HPF: NORMAL /HPF (ref 0–5)
SODIUM SERPL-SCNC: 137 MMOL/L (ref 136–145)
SP GR UR REFRACTOMETRY: 1.01 (ref 1–1.03)
SPECIMEN EXP DATE BLD: NORMAL
UA: UC IF INDICATED,UAUC: NORMAL
UROBILINOGEN UR QL STRIP.AUTO: 0.2 EU/DL (ref 0.2–1)
WBC # BLD AUTO: 9.2 K/UL (ref 4.1–11.1)
WBC URNS QL MICRO: NORMAL /HPF (ref 0–4)

## 2022-03-01 PROCEDURE — 81001 URINALYSIS AUTO W/SCOPE: CPT

## 2022-03-01 PROCEDURE — 80053 COMPREHEN METABOLIC PANEL: CPT

## 2022-03-01 PROCEDURE — 85610 PROTHROMBIN TIME: CPT

## 2022-03-01 PROCEDURE — 85027 COMPLETE CBC AUTOMATED: CPT

## 2022-03-01 PROCEDURE — 93005 ELECTROCARDIOGRAM TRACING: CPT

## 2022-03-01 PROCEDURE — 86900 BLOOD TYPING SEROLOGIC ABO: CPT

## 2022-03-01 PROCEDURE — 83036 HEMOGLOBIN GLYCOSYLATED A1C: CPT

## 2022-03-01 PROCEDURE — 97530 THERAPEUTIC ACTIVITIES: CPT | Performed by: PHYSICAL THERAPIST

## 2022-03-01 PROCEDURE — 97161 PT EVAL LOW COMPLEX 20 MIN: CPT | Performed by: PHYSICAL THERAPIST

## 2022-03-01 PROCEDURE — 36415 COLL VENOUS BLD VENIPUNCTURE: CPT

## 2022-03-01 RX ORDER — SODIUM CHLORIDE, SODIUM LACTATE, POTASSIUM CHLORIDE, CALCIUM CHLORIDE 600; 310; 30; 20 MG/100ML; MG/100ML; MG/100ML; MG/100ML
25 INJECTION, SOLUTION INTRAVENOUS CONTINUOUS
Status: CANCELLED | OUTPATIENT
Start: 2022-03-08

## 2022-03-01 NOTE — PERIOP NOTES
[facility]  Joint/Spine Preoperative Instructions      COVID TEST 03/04/22 Friday  MOB 1 [address] [address]  Anytime between 7 am and 11:45 am    Surgery Date 03/08/22          Time of Arrival --to be called with time  Contact [phone]  1. On the day of your surgery, please report to the Surgical Services Registration Desk and sign in at your designated time. The Surgery Center is located to the right of the Emergency Room. 2. You must have someone with you to drive you home. You should not drive a car for 24 hours following surgery. Please make arrangements for a friend or family member to stay with you for the first 24 hours after your surgery. 3. No food after midnight 03/07/22. Medications morning of surgery should be taken with a sip of water. Please follow pre-surgery drink instructions that were given at your Pre Admission Testing appointment. 4. We recommend you do not drink any alcoholic beverages for 24 hours before and after your surgery. 5. Contact your surgeons office for instructions on the following medications: non-steroidal anti-inflammatory drugs (i.e. Advil, Aleve), vitamins, and supplements. (Some surgeons will want you to stop these medications prior to surgery and others may allow you to take them)  **If you are currently taking Plavix, Coumadin, Aspirin and/or other blood-thinning agents, contact your surgeon for instructions. ** Your surgeon will partner with the physician prescribing these medications to determine if it is safe to stop or if you need to continue taking. Please do not stop taking these medications without instructions from your surgeon    6. Wear comfortable clothes. Wear glasses instead of contacts. Do not bring any money or jewelry. Please bring picture ID, insurance card, and any prearranged co-payment or hospital payment. Do not wear make-up, particularly mascara the morning of your surgery.   Do not wear nail polish, particularly if you are having foot /hand surgery. Wear your hair loose or down, no ponytails, buns, nahomy pins or clips. All body piercings must be removed. Please shower with antibacterial soap for three consecutive days before and on the morning of surgery, but do not apply any lotions, powders or deodorants after the shower on the day of surgery. Please use a fresh towels after each shower. Please sleep in clean clothes and change bed linens the night before surgery. Please do not shave for 48 hours prior to surgery. Shaving of the face is acceptable. 7. You should understand that if you do not follow these instructions your surgery may be cancelled. If your physical condition changes (I.e. fever, cold or flu) please contact your surgeon as soon as possible. 8. It is important that you be on time. If a situation occurs where you may be late, please call [phone] (OR Holding Area). 9. If you have any questions and or problems, please call [phone] (Pre-admission Testing). 10. Your surgery time may be subject to change. You will receive a phone call the evening prior if your time changes. 11.  If having outpatient surgery, you must have someone to drive you here, stay with you during the duration of your stay, and to drive you home at time of discharge. 12. The following link is for the educational video for patients and/or families. http://pink-avelar.org/. com/locations/prwqurpfi-abvbbgu-pqnbhfc/[place]/[facility]/educational-materials    13  Due to current COVID restrictions, only ONE adult may accompany you the day of the procedure. We have limited seating available. If our waiting room is at capacity, your ride may be asked to remain in their vehicle.   No children are allowed in the waiting room    Special Instructions:       TAKE ALL MEDICATIONS THE DAY OF SURGERY EXCEPT:no restrictions      I understand a pre-operative phone call will be made to verify my surgery time. In the event that I am not available, I give permission for a message to be left on my answering service and/or with another person?   yes          ___________________      __________   _________    (Signature of Patient)             (Witness)                (Date and Time)

## 2022-03-01 NOTE — PERIOP NOTES
Incentive Spirometer        Using the incentive spirometer helps expand the small air sacs of your lungs, helps you breathe deeply, and helps improve your lung function. Use your incentive spirometer twice a day (10 breaths each time) prior to surgery. How to Use Your Incentive Spirometer:  1. Hold the incentive spirometer in an upright position. 2. Breathe out as usual.   3. Place the mouthpiece in your mouth and seal your lips tightly around it. 4. Take a deep breath. Breathe in slowly and as deeply as possible. Keep the blue flow rate guide between the arrows. 5. Hold your breath as long as possible. Then exhale slowly and allow the piston to fall to the bottom of the column. 6. Rest for a few seconds and repeat steps one through five at least 10 times. PAT Tidal Volume________2500__________  x___1_____________  Date_____03/01/22__________________    Keshav Burgosr THE INCENTIVE SPIROMETER WITH YOU TO THE HOSPITAL ON THE DAY OF YOUR SURGERY. Opportunity given to ask and answer questions as well as to observe return demonstration.     Patient signature_____________________________    Witness____________________________

## 2022-03-01 NOTE — PROGRESS NOTES
[facility]  Physical Therapy Pre-surgery evaluation  [address] [facility], [place], operated by [facility], [address] [facility]    PHYSICAL THERAPY PRE THR SURGERY EVALUATION  Patient: Margaret Carpenter (30 y.o. male)  Date: 3/1/2022  Primary Diagnosis: pat  Procedure(s) (LRB):  LEFT TOTAL HIP ARTHROPLASTY DIRECT ANTERIOR APPROACH (Left)          ASSESSMENT :  Based on the objective data described below, the patient presents with impaired gait, balance, pain, and overall high level functional mobility due to end stage degenerative joint disease in the left hip. Discussed anticipated disposition to home with possible discharge within a 1 to 2 day time frame post-surgery. Patient is in agreement. Patient is hoping to discharge the same day as surgery    GOALS: (Goals have been discussed and agreed upon with patient.)  DISCHARGE GOALS: Time Frame: 1 DAY  1. Patient will demonstrate increased strength, range of motion, and pain control via a home exercise program in order to minimize functional deficits in preparation for their upcoming surgery. This will be achieved by using education, demonstration and through the use of an informational handout including a home exercise program.  REHABILITATION POTENTIAL FOR STATED GOALS: Excellent     RECOMMENDATIONS AND PLANNED INTERVENTIONS: (Benefits and precautions of physical therapy have been discussed with the patient.)  · Home Exercise Program  TREATMENT PLAN EFFECTIVE DATES: 3/1/2022 TO 3/1/2022  FREQUENCY/DURATION: Patient to continue to perform home exercise program at least twice daily until his surgery. SUBJECTIVE:   Patient stated My hip really starts to hurt when I'm walking.     OBJECTIVE DATA SUMMARY:   HISTORY:    Past Medical History:   Diagnosis Date    Chronic pain     Kidney stone     Psychiatric disorder     anxiety    Sleep apnea     no longer uses CPAP     Past Surgical History:   Procedure Laterality Date    HX HEENT      LASIK    HX KNEE ARTHROSCOPY Right     HX VASECTOMY       Prior Level of Function/Home Situation: patient reports complete independence with overall mobility and ADLs; no fall history          Home Situation  Home Environment: Private residence  # Steps to Enter: 4  Rails to Enter: Yes  Hand Rails : Bilateral  One/Two Story Residence: One story  Living Alone: No  Support Systems: Spouse/Significant Other  Patient Expects to be Discharged to[de-identified] Home  Current DME Used/Available at Home: Walker, rolling,Cane, straight  Tub or Shower Type: Tub/Shower combination      EXAMINATION/PRESENTATION/DECISION MAKING:     ADLs (Current Functional Status): Bathing/Showering:   [x] Independent  [] Requires Assistance from Someone  [] Sponge Bath Only   Ambulation:  [x] Independent  [] Walk Indoors Only  [] Walk Outdoors  [] Use Assistive Device  [] Use Wheelchair Only     Dressing:  [x] Independent    Requires Assistance from Someone for:  [] Sock/Shoes  [] Pants  [] Everything   Household Activities:  [x] Routine house and yard work  [] Light Housework Only  [] None       Critical Behavior:  Neurologic State: Alert,Appropriate for age  Orientation Level: Oriented X4          Strength:    Strength:  Within functional limits                    Tone & Sensation:   Tone: Normal              Sensation: Intact               Range Of Motion:  AROM: Within functional limits                       Coordination:  Coordination: Within functional limits    Functional Mobility:  Transfers:  Sit to Stand: Modified independent  Stand to Sit: Modified independent                       Balance:   Sitting: Intact  Standing: Intact  Ambulation/Gait Training:  Distance (ft): 100 Feet (ft)  Assistive Device:  (none)  Ambulation - Level of Assistance: Independent        Gait Abnormalities: Antalgic (increasing with distance walked)        Base of Support: Shift to right        Step Length: Right shortened (minimally)          Therapeutic Exercises:   The patient was educated in, has demonstrated, and has received written instructions to complete for their home exercise program per total hip replacement protocol. Functional Measure:  10 Meter walk test:  (Specify if any supplemental oxygen is used, the type, pre, during and post sats.)       Trial 1 (Time to Walk 10 Meters): 8.96 Seconds  Trial 2 (Time to Walk 10 Meters): 9.03 Seconds  Trial 3 (Time to Walk 10 Meters): 8.72 Seconds  Average : 8.9 Seconds  Score (Meters/Second): 1.1 Meters/Second           Minimal Detectable Change (MDC-90) = 0.1 m/s  Kristin VILLAGOMEZ. \"Comfortable and maximum walking speed of adults aged 20-79 years: reference values and determinants. \" Age and Agin Volume 26(1):15-9. Delaney Malik. \"Age- and gender-related test performance in community-dwelling elderly people: Six-Minute Walk Test, Barcenas Balance Scale, Timed Up & Go Test, and gait speeds. \" Physical Therapy: 2002 Volume 82(2):128-37. Louisa Sterling DM, Aixa PW, [facility] Bette BAUGH, [facility] BHAVESH. \"Assessing stability and change of four performance measures: a longitudinal study evaluating outcome following total hip and knee arthroplasty. \" [facility] Musculoskeletal Disorders: 2005 Volume 6(3). Rex Chin, PhD; Dulce Maria Campbell, PhD. Minus Edmond Paper: \"Walking Speed: the Sixth Vital Sign\" Journal of Geriatric Physical Therapy: 2009 - Volume 32 - Issue 2 - p 25 . Pain:  Pain Scale 1: Numeric (0 - 10)  Pain Intensity 1: 5  Pain Location 1: Hip  Pain Orientation 1: Left  Pain Description 1: Aching       Activity Tolerance:   good  Patient []   does  [x]   does not demonstrate signs/symptoms of shortness of breath/dyspnea on exertion/respiratory distress. COMMUNICATION/EDUCATION:   The patient was educated on:  [x]         Early post operative mobility is imperative to achieve a patient's desired outcomes and to restore biological function. [x]         Post operative hip precautions may/may not be applicable.  These precautions are based on the patient's physician and the procedure(s) performed. [x]         Anterior hip precautions including:  ·       No hip extension  ·       No external rotation  ·       No crossing of the legs/midline        The patients plan of care was discussed as follows:   [x]         The patient verbalized understanding of his plan in preparation for their upcoming surgery  []         The patient's  was present for this session  []        The patient reports that he/she does not have a  identified at this time  []         The  verbalized understanding of the education regarding the patient's upcoming surgery  [x]         Patient/family agree to work toward stated goals and plan of care. []         Patient understands intent and goals of therapy, but is neutral about his/her participation. []         Patient is unable to participate in goal setting and plan of care.       Thank you for this referral.  Laverne Wilson, PT   Time Calculation: 20 mins

## 2022-03-01 NOTE — ADVANCED PRACTICE NURSE
PAT Nurse Practitioner   Pre-Operative Chart Review/Assessment:-ORTHOPEDIC/NEUROSURGICAL SPINE                Patient Name:  Roscoe Donahue                                                           Age:   62 y.o.    :  1960     Today's Date:  3/2/2022     Date of PAT:   3/1/22      Date of Surgery:    3/8/2022      Procedure(s):  Left  Total Hip Arthroplasty     Surgeon:   Carmencita Rod     Medical Clearance:  Dr. Natalie Jean Baptiste:      1)  Cardiac Clearance:  Not requested        2)  Program for Diabetes Health Consult:  Not indicated-A1C 5.2      3)  Sleep Apnea evaluation:   Has dx of JAZZ- no longer compliant w/ CPAP (denies recent witnessed apnea in PAT)         4) Treatment for MRSA/Staph Aureus: Negative       5) Additional Concerns:  JAZZ (no longer compliant), anxiety, current smoker, ETOH \"28-41 beers/week\" (per hx)                Vital Signs:         Visit Vitals  BP (!) 155/89 (BP 1 Location: Left upper arm)   Pulse 78   Temp 98 °F (36.7 °C)   Resp 20   Ht 6' (1.829 m)   Wt 91.8 kg (202 lb 6.1 oz)   SpO2 99%   BMI 27.45 kg/m²                        ____________________________________________  PAST MEDICAL HISTORY  Past Medical History:   Diagnosis Date    Chronic pain     Kidney stone     Psychiatric disorder     anxiety    Sleep apnea     no longer uses CPAP      ____________________________________________  PAST SURGICAL HISTORY  Past Surgical History:   Procedure Laterality Date    HX HEENT      LASIK    HX KNEE ARTHROSCOPY Right     HX VASECTOMY        ____________________________________________  HOME MEDICATIONS    Current Outpatient Medications   Medication Sig    escitalopram oxalate (LEXAPRO) 10 mg tablet Take 10 mg by mouth daily.      No current facility-administered medications for this encounter.      ____________________________________________  ALLERGIES  No Known Allergies   ____________________________________________  SOCIAL HISTORY  Social History Tobacco Use    Smoking status: Current Every Day Smoker     Packs/day: 1.00    Smokeless tobacco: Never Used   Substance Use Topics    Alcohol use: Yes     Alcohol/week: 28.0 - 41.0 standard drinks     Types: 28 - 41 Cans of beer per week      ____________________________________________  COVID VACCINATION STATUS:      Internal Administration   First Dose COVID-19, Moderna, Primary or Immunocompromised Series, MRNA, PF, 100mcg/0.5mL  03/26/2021   Second Dose COVID-19, Moderna, Primary or Immunocompromised Series, MRNA, PF, 100mcg/0.5mL  04/29/2021      Last COVID Lab No results found for: Antoni Prows, RCV2CT, CVD2M, COV2, XPLCVT, [id number], [id number], COVV, [facility] Outpatient Visit on 03/01/2022   Component Date Value Ref Range Status    WBC 03/01/2022 9.2  4.1 - 11.1 K/uL Final    RBC 03/01/2022 4.82  4.10 - 5.70 M/uL Final    HGB 03/01/2022 15.8  12.1 - 17.0 g/dL Final    HCT 03/01/2022 45.8  36.6 - 50.3 % Final    MCV 03/01/2022 95.0  80.0 - 99.0 FL Final    MCH 03/01/2022 32.8  26.0 - 34.0 PG Final    MCHC 03/01/2022 34.5  30.0 - 36.5 g/dL Final    RDW 03/01/2022 12.8  11.5 - 14.5 % Final    PLATELET 38/34/0204 881  150 - 400 K/uL Final    MPV 03/01/2022 8.3* 8.9 - 12.9 FL Final    NRBC 03/01/2022 0.0  0  WBC Final    ABSOLUTE NRBC 03/01/2022 0.00  0.00 - 0.01 K/uL Final    Special Requests: 03/01/2022 NO SPECIAL REQUESTS    Final    Culture result: 03/01/2022 MRSA NOT PRESENT    Final    Culture result: 03/01/2022 Screening of patient nares for MRSA is for surveillance purposes and, if positive, to facilitate isolation considerations in high risk settings. It is not intended for automatic decolonization interventions per se as regimens are not sufficiently effective to warrant routine use.     Final    Ventricular Rate 03/01/2022 66  BPM Final    Atrial Rate 03/01/2022 66  BPM Final    P-R Interval 03/01/2022 172  ms Final    QRS Duration 03/01/2022 84  ms Final    Q-T Interval 03/01/2022 394  ms Final    QTC Calculation (Bezet) 03/01/2022 413  ms Final    Calculated P Axis 03/01/2022 35  degrees Final    Calculated R Axis 03/01/2022 55  degrees Final    Calculated T Axis 03/01/2022 49  degrees Final    Diagnosis 03/01/2022    Final                    Value:Normal sinus rhythm  Normal ECG  Confirmed by Michael Uriostegui ([id number]) on 3/2/2022 6:53:42 AM      Hemoglobin A1c 03/01/2022 5.2  4.0 - 5.6 % Final    Comment: NEW METHOD  PLEASE NOTE NEW REFERENCE RANGE  (NOTE)  HbA1C Interpretive Ranges  <5.7              Normal  5.7 - 6.4         Consider Prediabetes  >6.5              Consider Diabetes      Est. average glucose 03/01/2022 103  mg/dL Final    INR 03/01/2022 1.0  0.9 - 1.1   Final    A single therapeutic range for Vit K antagonists may not be optimal for all indications - see June, 2008 issue of Chest, American College of Chest Physicians Evidence-Based Clinical Practice Guidelines, 8th Edition.     Prothrombin time 03/01/2022 10.2  9.0 - 11.1 sec Final    Color 03/01/2022 YELLOW/STRAW    Final    Color Reference Range: Straw, Yellow or Dark Yellow    Appearance 03/01/2022 CLEAR  CLEAR   Final    Specific gravity 03/01/2022 1.010  1.003 - 1.030   Final    pH (UA) 03/01/2022 6.0  5.0 - 8.0   Final    Protein 03/01/2022 Negative  NEG mg/dL Final    Glucose 03/01/2022 Negative  NEG mg/dL Final    Ketone 03/01/2022 Negative  NEG mg/dL Final    Bilirubin 03/01/2022 Negative  NEG   Final    Blood 03/01/2022 Negative  NEG   Final    Urobilinogen 03/01/2022 0.2  0.2 - 1.0 EU/dL Final    Nitrites 03/01/2022 Negative  NEG   Final    Leukocyte Esterase 03/01/2022 Negative  NEG   Final    WBC 03/01/2022 0-4  0 - 4 /hpf Final    RBC 03/01/2022 0-5  0 - 5 /hpf Final    Epithelial cells 03/01/2022 FEW  FEW /lpf Final    Epithelial cell category consists of squamous cells and /or transitional urothelial cells. Renal tubular cells, if present, are separately identified as such.  Bacteria 03/01/2022 Negative  NEG /hpf Final    UA:UC IF INDICATED 03/01/2022 CULTURE NOT INDICATED BY UA RESULT  CNI   Final    Hyaline cast 03/01/2022 0-2  0 - 5 /lpf Final    Sodium 03/01/2022 137  136 - 145 mmol/L Final    Potassium 03/01/2022 4.3  3.5 - 5.1 mmol/L Final    Chloride 03/01/2022 107  97 - 108 mmol/L Final    CO2 03/01/2022 25  21 - 32 mmol/L Final    Anion gap 03/01/2022 5  5 - 15 mmol/L Final    Glucose 03/01/2022 117* 65 - 100 mg/dL Final    BUN 03/01/2022 13  6 - 20 MG/DL Final    Creatinine 03/01/2022 0.87  0.70 - 1.30 MG/DL Final    BUN/Creatinine ratio 03/01/2022 15  12 - 20   Final    GFR est AA 03/01/2022 >60  >60 ml/min/1.73m2 Final    GFR est non-AA 03/01/2022 >60  >60 ml/min/1.73m2 Final    Estimated GFR is calculated using the IDMS-traceable Modification of Diet in Renal Disease (MDRD) Study equation, reported for both  Americans (GFRAA) and non- Americans (GFRNA), and normalized to 1.73m2 body surface area. The physician must decide which value applies to the patient.  Calcium 03/01/2022 9.3  8.5 - 10.1 MG/DL Final    Bilirubin, total 03/01/2022 0.5  0.2 - 1.0 MG/DL Final    ALT (SGPT) 03/01/2022 36  12 - 78 U/L Final    AST (SGOT) 03/01/2022 22  15 - 37 U/L Final    Alk. phosphatase 03/01/2022 87  45 - 117 U/L Final    Protein, total 03/01/2022 8.0  6.4 - 8.2 g/dL Final    Albumin 03/01/2022 3.5  3.5 - 5.0 g/dL Final    Globulin 03/01/2022 4.5* 2.0 - 4.0 g/dL Final    A-G Ratio 03/01/2022 0.8* 1.1 - 2.2   Final    Crossmatch Expiration 03/01/2022 03/11/2022,2359   Final    ABO/Rh(D) 03/01/2022 O POSITIVE   Final    Antibody screen 03/01/2022 NEG   Final        XR Results (most recent):    No results found for this or any previous visit. Skin:   Denies open wounds, cuts, sores, rashes or other areas of concern in PAT assessment.         Yusuf oGdoy Penelope Vance, NP

## 2022-03-01 NOTE — PERIOP NOTES
The [facility] \"Your Path to a More Active Life\" orthopedic total knee or total hip educational video and the [facility] patient handbook provided & reviewed during the patients pre-admission testing (PAT) appointment. An opportunity for questions was provided, patient verbalized understanding.

## 2022-03-01 NOTE — PERIOP NOTES
Orthopedic and Spine Patients: Instructions on When You Can   Eat or Drink Before Surgery      You have been provided 2 pre-surgery drinks received at your pre-admission testing appointment.  Night before surgery:  o You should drink one bottle of the  pre-surgery drink at bedtime. No food after midnight!  Day of Surgery:  o Complete 2nd bottle of the pre-surgery drink 1 hour prior to arrival at hospital.  For questions call Pre-Admission Testing at [phone]. They are available from 8:00am-5:00pm, Monday through Friday.

## 2022-03-02 LAB
ATRIAL RATE: 66 BPM
BACTERIA SPEC CULT: NORMAL
BACTERIA SPEC CULT: NORMAL
CALCULATED P AXIS, ECG09: 35 DEGREES
CALCULATED R AXIS, ECG10: 55 DEGREES
CALCULATED T AXIS, ECG11: 49 DEGREES
DIAGNOSIS, 93000: NORMAL
P-R INTERVAL, ECG05: 172 MS
Q-T INTERVAL, ECG07: 394 MS
QRS DURATION, ECG06: 84 MS
QTC CALCULATION (BEZET), ECG08: 413 MS
SERVICE CMNT-IMP: NORMAL
VENTRICULAR RATE, ECG03: 66 BPM

## 2022-03-04 ENCOUNTER — HOSPITAL ENCOUNTER (OUTPATIENT)
Dept: PREADMISSION TESTING | Age: 62
Discharge: HOME | End: 2022-03-04
Payer: COMMERCIAL

## 2022-03-04 PROCEDURE — U0005 INFEC AGEN DETEC AMPLI PROBE: HCPCS

## 2022-03-05 LAB
SARS-COV-2, XPLCVT: NOT DETECTED
SOURCE, COVRS: NORMAL

## 2022-03-07 ENCOUNTER — ANESTHESIA EVENT (OUTPATIENT)
Dept: SURGERY | Age: 62
End: 2022-03-07
Payer: COMMERCIAL

## 2022-03-08 ENCOUNTER — APPOINTMENT (OUTPATIENT)
Dept: GENERAL RADIOLOGY | Age: 62
End: 2022-03-08
Attending: ORTHOPAEDIC SURGERY
Payer: COMMERCIAL

## 2022-03-08 ENCOUNTER — ANESTHESIA (OUTPATIENT)
Dept: SURGERY | Age: 62
End: 2022-03-08
Payer: COMMERCIAL

## 2022-03-08 ENCOUNTER — HOSPITAL ENCOUNTER (OUTPATIENT)
Age: 62
Discharge: HOME | End: 2022-03-08
Attending: ORTHOPAEDIC SURGERY | Admitting: ORTHOPAEDIC SURGERY
Payer: COMMERCIAL

## 2022-03-08 VITALS
HEART RATE: 73 BPM | RESPIRATION RATE: 18 BRPM | TEMPERATURE: 97.7 F | SYSTOLIC BLOOD PRESSURE: 130 MMHG | WEIGHT: 197.97 LBS | BODY MASS INDEX: 26.85 KG/M2 | OXYGEN SATURATION: 95 % | DIASTOLIC BLOOD PRESSURE: 70 MMHG

## 2022-03-08 DIAGNOSIS — Z96.642 STATUS POST TOTAL REPLACEMENT OF LEFT HIP: Primary | ICD-10-CM

## 2022-03-08 PROBLEM — M16.12 UNILATERAL PRIMARY OSTEOARTHRITIS, LEFT HIP: Status: ACTIVE | Noted: 2022-03-08

## 2022-03-08 PROCEDURE — 77030013079 HC BLNKT BAIR HGGR 3M -A: Performed by: STUDENT IN AN ORGANIZED HEALTH CARE EDUCATION/TRAINING PROGRAM

## 2022-03-08 PROCEDURE — C1776 JOINT DEVICE (IMPLANTABLE): HCPCS | Performed by: ORTHOPAEDIC SURGERY

## 2022-03-08 PROCEDURE — 74011000250 HC RX REV CODE- 250: Performed by: ORTHOPAEDIC SURGERY

## 2022-03-08 PROCEDURE — 74011250636 HC RX REV CODE- 250/636: Performed by: NURSE ANESTHETIST, CERTIFIED REGISTERED

## 2022-03-08 PROCEDURE — 27130 TOTAL HIP ARTHROPLASTY: CPT | Performed by: ORTHOPAEDIC SURGERY

## 2022-03-08 PROCEDURE — 76010000162 HC OR TIME 1.5 TO 2 HR INTENSV-TIER 1: Performed by: ORTHOPAEDIC SURGERY

## 2022-03-08 PROCEDURE — 76000 FLUOROSCOPY <1 HR PHYS/QHP: CPT

## 2022-03-08 PROCEDURE — 74011250636 HC RX REV CODE- 250/636: Performed by: ORTHOPAEDIC SURGERY

## 2022-03-08 PROCEDURE — 74011000250 HC RX REV CODE- 250: Performed by: NURSE ANESTHETIST, CERTIFIED REGISTERED

## 2022-03-08 PROCEDURE — 97530 THERAPEUTIC ACTIVITIES: CPT

## 2022-03-08 PROCEDURE — 77030010507 HC ADH SKN DERMBND J&J -B: Performed by: ORTHOPAEDIC SURGERY

## 2022-03-08 PROCEDURE — 97110 THERAPEUTIC EXERCISES: CPT | Performed by: PHYSICAL THERAPIST

## 2022-03-08 PROCEDURE — 74011000250 HC RX REV CODE- 250: Performed by: STUDENT IN AN ORGANIZED HEALTH CARE EDUCATION/TRAINING PROGRAM

## 2022-03-08 PROCEDURE — 77030038692 HC WND DEB SYS IRMX -B: Performed by: ORTHOPAEDIC SURGERY

## 2022-03-08 PROCEDURE — 97535 SELF CARE MNGMENT TRAINING: CPT

## 2022-03-08 PROCEDURE — 77030011264 HC ELECTRD BLD EXT COVD -A: Performed by: ORTHOPAEDIC SURGERY

## 2022-03-08 PROCEDURE — 76060000034 HC ANESTHESIA 1.5 TO 2 HR: Performed by: ORTHOPAEDIC SURGERY

## 2022-03-08 PROCEDURE — 77030020061 HC IV BLD WRMR ADMIN SET 3M -B: Performed by: STUDENT IN AN ORGANIZED HEALTH CARE EDUCATION/TRAINING PROGRAM

## 2022-03-08 PROCEDURE — 74011250637 HC RX REV CODE- 250/637: Performed by: ORTHOPAEDIC SURGERY

## 2022-03-08 PROCEDURE — 2709999900 HC NON-CHARGEABLE SUPPLY: Performed by: ORTHOPAEDIC SURGERY

## 2022-03-08 PROCEDURE — 97530 THERAPEUTIC ACTIVITIES: CPT | Performed by: PHYSICAL THERAPIST

## 2022-03-08 PROCEDURE — 97161 PT EVAL LOW COMPLEX 20 MIN: CPT | Performed by: PHYSICAL THERAPIST

## 2022-03-08 PROCEDURE — 73501 X-RAY EXAM HIP UNI 1 VIEW: CPT

## 2022-03-08 PROCEDURE — 77030031139 HC SUT VCRL2 J&J -A: Performed by: ORTHOPAEDIC SURGERY

## 2022-03-08 PROCEDURE — 97165 OT EVAL LOW COMPLEX 30 MIN: CPT

## 2022-03-08 PROCEDURE — 76210000016 HC OR PH I REC 1 TO 1.5 HR: Performed by: ORTHOPAEDIC SURGERY

## 2022-03-08 PROCEDURE — 77030039825 HC MSK NSL PAP SUPERNO2VA VYRM -B: Performed by: STUDENT IN AN ORGANIZED HEALTH CARE EDUCATION/TRAINING PROGRAM

## 2022-03-08 PROCEDURE — 72170 X-RAY EXAM OF PELVIS: CPT

## 2022-03-08 PROCEDURE — 97116 GAIT TRAINING THERAPY: CPT | Performed by: PHYSICAL THERAPIST

## 2022-03-08 PROCEDURE — 77030018673: Performed by: ORTHOPAEDIC SURGERY

## 2022-03-08 PROCEDURE — 77030006835 HC BLD SAW SAG STRY -B: Performed by: ORTHOPAEDIC SURGERY

## 2022-03-08 PROCEDURE — 77030002933 HC SUT MCRYL J&J -A: Performed by: ORTHOPAEDIC SURGERY

## 2022-03-08 PROCEDURE — 74011250636 HC RX REV CODE- 250/636: Performed by: STUDENT IN AN ORGANIZED HEALTH CARE EDUCATION/TRAINING PROGRAM

## 2022-03-08 DEVICE — SHELL ACET DIA56MM HYDROXY APATITE 5 H CLUS H TRIDENT II: Type: IMPLANTABLE DEVICE | Site: HIP | Status: FUNCTIONAL

## 2022-03-08 DEVICE — STEM FEM SZ 7 127D 37X114MM -- ACCOLADE II V40: Type: IMPLANTABLE DEVICE | Site: HIP | Status: FUNCTIONAL

## 2022-03-08 DEVICE — HIP H2 TOT ADV OTHER HD -- IMPL CAPPED H2: Type: IMPLANTABLE DEVICE | Status: FUNCTIONAL

## 2022-03-08 DEVICE — INSERT ACET F 0 DEG 36 MM HIP X3 TRIDENT: Type: IMPLANTABLE DEVICE | Site: HIP | Status: FUNCTIONAL

## 2022-03-08 DEVICE — HEAD FEM DELT V40 +0MM NK 36MM -- V40 BIOLOX: Type: IMPLANTABLE DEVICE | Site: HIP | Status: FUNCTIONAL

## 2022-03-08 RX ORDER — SODIUM CHLORIDE, SODIUM LACTATE, POTASSIUM CHLORIDE, CALCIUM CHLORIDE 600; 310; 30; 20 MG/100ML; MG/100ML; MG/100ML; MG/100ML
25 INJECTION, SOLUTION INTRAVENOUS CONTINUOUS
Status: DISCONTINUED | OUTPATIENT
Start: 2022-03-08 | End: 2022-03-08 | Stop reason: HOSPADM

## 2022-03-08 RX ORDER — GLYCOPYRROLATE 0.2 MG/ML
INJECTION INTRAMUSCULAR; INTRAVENOUS AS NEEDED
Status: DISCONTINUED | OUTPATIENT
Start: 2022-03-08 | End: 2022-03-08 | Stop reason: HOSPADM

## 2022-03-08 RX ORDER — POLYETHYLENE GLYCOL 3350 17 G/17G
17 POWDER, FOR SOLUTION ORAL DAILY
Status: DISCONTINUED | OUTPATIENT
Start: 2022-03-08 | End: 2022-03-08 | Stop reason: HOSPADM

## 2022-03-08 RX ORDER — KETAMINE HYDROCHLORIDE 100 MG/ML
INJECTION, SOLUTION INTRAMUSCULAR; INTRAVENOUS AS NEEDED
Status: DISCONTINUED | OUTPATIENT
Start: 2022-03-08 | End: 2022-03-08 | Stop reason: HOSPADM

## 2022-03-08 RX ORDER — DIPHENHYDRAMINE HYDROCHLORIDE 50 MG/ML
12.5 INJECTION, SOLUTION INTRAMUSCULAR; INTRAVENOUS
Status: DISCONTINUED | OUTPATIENT
Start: 2022-03-08 | End: 2022-03-08 | Stop reason: HOSPADM

## 2022-03-08 RX ORDER — ACETAMINOPHEN 325 MG/1
650 TABLET ORAL
Status: DISCONTINUED | OUTPATIENT
Start: 2022-03-08 | End: 2022-03-08 | Stop reason: SDUPTHER

## 2022-03-08 RX ORDER — PROPOFOL 10 MG/ML
INJECTION, EMULSION INTRAVENOUS
Status: DISCONTINUED | OUTPATIENT
Start: 2022-03-08 | End: 2022-03-08 | Stop reason: HOSPADM

## 2022-03-08 RX ORDER — AMOXICILLIN 250 MG
1 CAPSULE ORAL 2 TIMES DAILY
Qty: 14 TABLET | Refills: 0 | Status: SHIPPED | OUTPATIENT
Start: 2022-03-08 | End: 2022-03-15

## 2022-03-08 RX ORDER — ROPIVACAINE HYDROCHLORIDE 5 MG/ML
INJECTION, SOLUTION EPIDURAL; INFILTRATION; PERINEURAL AS NEEDED
Status: DISCONTINUED | OUTPATIENT
Start: 2022-03-08 | End: 2022-03-08 | Stop reason: HOSPADM

## 2022-03-08 RX ORDER — KETOROLAC TROMETHAMINE 30 MG/ML
30 INJECTION, SOLUTION INTRAMUSCULAR; INTRAVENOUS EVERY 6 HOURS
Status: DISCONTINUED | OUTPATIENT
Start: 2022-03-08 | End: 2022-03-08 | Stop reason: HOSPADM

## 2022-03-08 RX ORDER — CELECOXIB 200 MG/1
200 CAPSULE ORAL ONCE
Status: COMPLETED | OUTPATIENT
Start: 2022-03-08 | End: 2022-03-08

## 2022-03-08 RX ORDER — NALOXONE HYDROCHLORIDE 0.4 MG/ML
0.4 INJECTION, SOLUTION INTRAMUSCULAR; INTRAVENOUS; SUBCUTANEOUS AS NEEDED
Status: DISCONTINUED | OUTPATIENT
Start: 2022-03-08 | End: 2022-03-08 | Stop reason: HOSPADM

## 2022-03-08 RX ORDER — PHENYLEPHRINE HCL IN 0.9% NACL 0.4MG/10ML
SYRINGE (ML) INTRAVENOUS AS NEEDED
Status: DISCONTINUED | OUTPATIENT
Start: 2022-03-08 | End: 2022-03-08 | Stop reason: HOSPADM

## 2022-03-08 RX ORDER — ONDANSETRON 2 MG/ML
4 INJECTION INTRAMUSCULAR; INTRAVENOUS AS NEEDED
Status: DISCONTINUED | OUTPATIENT
Start: 2022-03-08 | End: 2022-03-08 | Stop reason: HOSPADM

## 2022-03-08 RX ORDER — HYDROMORPHONE HYDROCHLORIDE 1 MG/ML
0.2 INJECTION, SOLUTION INTRAMUSCULAR; INTRAVENOUS; SUBCUTANEOUS
Status: DISCONTINUED | OUTPATIENT
Start: 2022-03-08 | End: 2022-03-08 | Stop reason: HOSPADM

## 2022-03-08 RX ORDER — CEFAZOLIN SODIUM 1 G/3ML
INJECTION, POWDER, FOR SOLUTION INTRAMUSCULAR; INTRAVENOUS AS NEEDED
Status: DISCONTINUED | OUTPATIENT
Start: 2022-03-08 | End: 2022-03-08 | Stop reason: HOSPADM

## 2022-03-08 RX ORDER — ASPIRIN 325 MG
325 TABLET, DELAYED RELEASE (ENTERIC COATED) ORAL 2 TIMES DAILY
Status: DISCONTINUED | OUTPATIENT
Start: 2022-03-08 | End: 2022-03-08 | Stop reason: HOSPADM

## 2022-03-08 RX ORDER — BUPIVACAINE HYDROCHLORIDE 5 MG/ML
INJECTION, SOLUTION EPIDURAL; INTRACAUDAL
Status: COMPLETED | OUTPATIENT
Start: 2022-03-08 | End: 2022-03-08

## 2022-03-08 RX ORDER — DEXAMETHASONE SODIUM PHOSPHATE 4 MG/ML
10 INJECTION, SOLUTION INTRA-ARTICULAR; INTRALESIONAL; INTRAMUSCULAR; INTRAVENOUS; SOFT TISSUE ONCE
Status: DISCONTINUED | OUTPATIENT
Start: 2022-03-08 | End: 2022-03-08 | Stop reason: HOSPADM

## 2022-03-08 RX ORDER — OXYCODONE HYDROCHLORIDE 5 MG/1
10 TABLET ORAL
Status: DISCONTINUED | OUTPATIENT
Start: 2022-03-08 | End: 2022-03-08 | Stop reason: HOSPADM

## 2022-03-08 RX ORDER — TRANEXAMIC ACID 100 MG/ML
INJECTION, SOLUTION INTRAVENOUS AS NEEDED
Status: DISCONTINUED | OUTPATIENT
Start: 2022-03-08 | End: 2022-03-08 | Stop reason: HOSPADM

## 2022-03-08 RX ORDER — SODIUM CHLORIDE 0.9 % (FLUSH) 0.9 %
5-40 SYRINGE (ML) INJECTION AS NEEDED
Status: DISCONTINUED | OUTPATIENT
Start: 2022-03-08 | End: 2022-03-08 | Stop reason: HOSPADM

## 2022-03-08 RX ORDER — ACETAMINOPHEN 500 MG
1000 TABLET ORAL EVERY 6 HOURS
Qty: 56 TABLET | Refills: 0 | Status: SHIPPED
Start: 2022-03-08 | End: 2022-03-15

## 2022-03-08 RX ORDER — FAMOTIDINE 20 MG/1
20 TABLET, FILM COATED ORAL 2 TIMES DAILY
Qty: 60 TABLET | Refills: 0 | Status: SHIPPED | OUTPATIENT
Start: 2022-03-08 | End: 2022-04-07

## 2022-03-08 RX ORDER — ONDANSETRON 2 MG/ML
INJECTION INTRAMUSCULAR; INTRAVENOUS AS NEEDED
Status: DISCONTINUED | OUTPATIENT
Start: 2022-03-08 | End: 2022-03-08 | Stop reason: HOSPADM

## 2022-03-08 RX ORDER — MIDAZOLAM HYDROCHLORIDE 1 MG/ML
INJECTION, SOLUTION INTRAMUSCULAR; INTRAVENOUS AS NEEDED
Status: DISCONTINUED | OUTPATIENT
Start: 2022-03-08 | End: 2022-03-08 | Stop reason: HOSPADM

## 2022-03-08 RX ORDER — SODIUM CHLORIDE, SODIUM LACTATE, POTASSIUM CHLORIDE, CALCIUM CHLORIDE 600; 310; 30; 20 MG/100ML; MG/100ML; MG/100ML; MG/100ML
INJECTION, SOLUTION INTRAVENOUS
Status: DISCONTINUED | OUTPATIENT
Start: 2022-03-08 | End: 2022-03-08 | Stop reason: HOSPADM

## 2022-03-08 RX ORDER — ONDANSETRON 4 MG/1
4 TABLET, ORALLY DISINTEGRATING ORAL
Status: DISCONTINUED | OUTPATIENT
Start: 2022-03-08 | End: 2022-03-08 | Stop reason: HOSPADM

## 2022-03-08 RX ORDER — DEXMEDETOMIDINE HYDROCHLORIDE 100 UG/ML
INJECTION, SOLUTION INTRAVENOUS AS NEEDED
Status: DISCONTINUED | OUTPATIENT
Start: 2022-03-08 | End: 2022-03-08 | Stop reason: HOSPADM

## 2022-03-08 RX ORDER — AMOXICILLIN 250 MG
1 CAPSULE ORAL 2 TIMES DAILY
Status: DISCONTINUED | OUTPATIENT
Start: 2022-03-08 | End: 2022-03-08 | Stop reason: HOSPADM

## 2022-03-08 RX ORDER — SODIUM CHLORIDE 9 MG/ML
125 INJECTION, SOLUTION INTRAVENOUS CONTINUOUS
Status: DISCONTINUED | OUTPATIENT
Start: 2022-03-08 | End: 2022-03-08 | Stop reason: HOSPADM

## 2022-03-08 RX ORDER — OXYCODONE HYDROCHLORIDE 5 MG/1
5-10 TABLET ORAL
Qty: 30 TABLET | Refills: 0 | Status: SHIPPED | OUTPATIENT
Start: 2022-03-08 | End: 2022-03-15

## 2022-03-08 RX ORDER — ASPIRIN 325 MG
325 TABLET, DELAYED RELEASE (ENTERIC COATED) ORAL 2 TIMES DAILY
Qty: 60 TABLET | Refills: 0 | Status: SHIPPED | OUTPATIENT
Start: 2022-03-08 | End: 2022-04-07

## 2022-03-08 RX ORDER — ACETAMINOPHEN 500 MG
1000 TABLET ORAL EVERY 6 HOURS
Status: DISCONTINUED | OUTPATIENT
Start: 2022-03-08 | End: 2022-03-08 | Stop reason: HOSPADM

## 2022-03-08 RX ORDER — FACIAL-BODY WIPES
10 EACH TOPICAL DAILY PRN
Status: DISCONTINUED | OUTPATIENT
Start: 2022-03-10 | End: 2022-03-08 | Stop reason: HOSPADM

## 2022-03-08 RX ORDER — FENTANYL CITRATE 50 UG/ML
25 INJECTION, SOLUTION INTRAMUSCULAR; INTRAVENOUS
Status: DISCONTINUED | OUTPATIENT
Start: 2022-03-08 | End: 2022-03-08 | Stop reason: HOSPADM

## 2022-03-08 RX ORDER — OXYCODONE HYDROCHLORIDE 5 MG/1
5 TABLET ORAL AS NEEDED
Status: DISCONTINUED | OUTPATIENT
Start: 2022-03-08 | End: 2022-03-08 | Stop reason: HOSPADM

## 2022-03-08 RX ORDER — SODIUM CHLORIDE 0.9 % (FLUSH) 0.9 %
5-40 SYRINGE (ML) INJECTION EVERY 8 HOURS
Status: DISCONTINUED | OUTPATIENT
Start: 2022-03-08 | End: 2022-03-08 | Stop reason: HOSPADM

## 2022-03-08 RX ORDER — FAMOTIDINE 20 MG/1
20 TABLET, FILM COATED ORAL 2 TIMES DAILY
Status: DISCONTINUED | OUTPATIENT
Start: 2022-03-08 | End: 2022-03-08 | Stop reason: HOSPADM

## 2022-03-08 RX ORDER — ESCITALOPRAM OXALATE 10 MG/1
10 TABLET ORAL DAILY
Status: DISCONTINUED | OUTPATIENT
Start: 2022-03-08 | End: 2022-03-08 | Stop reason: HOSPADM

## 2022-03-08 RX ORDER — OXYCODONE HYDROCHLORIDE 5 MG/1
5 TABLET ORAL
Status: DISCONTINUED | OUTPATIENT
Start: 2022-03-08 | End: 2022-03-08 | Stop reason: HOSPADM

## 2022-03-08 RX ORDER — FENTANYL CITRATE 50 UG/ML
INJECTION, SOLUTION INTRAMUSCULAR; INTRAVENOUS AS NEEDED
Status: DISCONTINUED | OUTPATIENT
Start: 2022-03-08 | End: 2022-03-08 | Stop reason: HOSPADM

## 2022-03-08 RX ORDER — DEXAMETHASONE SODIUM PHOSPHATE 4 MG/ML
INJECTION, SOLUTION INTRA-ARTICULAR; INTRALESIONAL; INTRAMUSCULAR; INTRAVENOUS; SOFT TISSUE AS NEEDED
Status: DISCONTINUED | OUTPATIENT
Start: 2022-03-08 | End: 2022-03-08 | Stop reason: HOSPADM

## 2022-03-08 RX ORDER — ACETAMINOPHEN 500 MG
1000 TABLET ORAL ONCE
Status: COMPLETED | OUTPATIENT
Start: 2022-03-08 | End: 2022-03-08

## 2022-03-08 RX ORDER — HYDROMORPHONE HYDROCHLORIDE 1 MG/ML
0.5 INJECTION, SOLUTION INTRAMUSCULAR; INTRAVENOUS; SUBCUTANEOUS
Status: DISCONTINUED | OUTPATIENT
Start: 2022-03-08 | End: 2022-03-08 | Stop reason: HOSPADM

## 2022-03-08 RX ADMIN — DEXMEDETOMIDINE HYDROCHLORIDE 2 MCG: 100 INJECTION, SOLUTION, CONCENTRATE INTRAVENOUS at 07:31

## 2022-03-08 RX ADMIN — MIDAZOLAM HYDROCHLORIDE 2 MG: 1 INJECTION, SOLUTION INTRAMUSCULAR; INTRAVENOUS at 07:00

## 2022-03-08 RX ADMIN — CEFAZOLIN 2 G: 1 INJECTION, POWDER, FOR SOLUTION INTRAMUSCULAR; INTRAVENOUS; PARENTERAL at 07:40

## 2022-03-08 RX ADMIN — DEXMEDETOMIDINE HYDROCHLORIDE 2 MCG: 100 INJECTION, SOLUTION, CONCENTRATE INTRAVENOUS at 07:39

## 2022-03-08 RX ADMIN — SODIUM CHLORIDE 10 MCG/MIN: 900 INJECTION, SOLUTION INTRAVENOUS at 08:10

## 2022-03-08 RX ADMIN — TRANEXAMIC ACID 1 G: 100 INJECTION, SOLUTION INTRAVENOUS at 07:36

## 2022-03-08 RX ADMIN — Medication 80 MCG: at 07:15

## 2022-03-08 RX ADMIN — MIDAZOLAM HYDROCHLORIDE 3 MG: 1 INJECTION, SOLUTION INTRAMUSCULAR; INTRAVENOUS at 07:23

## 2022-03-08 RX ADMIN — Medication 1 AMPULE: at 11:43

## 2022-03-08 RX ADMIN — Medication 1000 MG: at 06:23

## 2022-03-08 RX ADMIN — Medication 80 MCG: at 08:10

## 2022-03-08 RX ADMIN — SODIUM CHLORIDE, POTASSIUM CHLORIDE, SODIUM LACTATE AND CALCIUM CHLORIDE: 600; 310; 30; 20 INJECTION, SOLUTION INTRAVENOUS at 07:23

## 2022-03-08 RX ADMIN — KETAMINE HYDROCHLORIDE 10 MG: 100 INJECTION, SOLUTION, CONCENTRATE INTRAMUSCULAR; INTRAVENOUS at 08:42

## 2022-03-08 RX ADMIN — KETAMINE HYDROCHLORIDE 10 MG: 100 INJECTION, SOLUTION, CONCENTRATE INTRAMUSCULAR; INTRAVENOUS at 07:35

## 2022-03-08 RX ADMIN — KETAMINE HYDROCHLORIDE 10 MG: 100 INJECTION, SOLUTION, CONCENTRATE INTRAMUSCULAR; INTRAVENOUS at 07:44

## 2022-03-08 RX ADMIN — GLYCOPYRROLATE 0.2 MG: 0.2 INJECTION, SOLUTION INTRAMUSCULAR; INTRAVENOUS at 07:00

## 2022-03-08 RX ADMIN — TRANEXAMIC ACID 1 G: 100 INJECTION, SOLUTION INTRAVENOUS at 08:39

## 2022-03-08 RX ADMIN — CEFAZOLIN SODIUM 2 G: 1 INJECTION, POWDER, FOR SOLUTION INTRAMUSCULAR; INTRAVENOUS at 15:13

## 2022-03-08 RX ADMIN — CELECOXIB 200 MG: 200 CAPSULE ORAL at 06:23

## 2022-03-08 RX ADMIN — DOCUSATE SODIUM 50MG AND SENNOSIDES 8.6MG 1 TABLET: 8.6; 5 TABLET, FILM COATED ORAL at 11:44

## 2022-03-08 RX ADMIN — MIDAZOLAM HYDROCHLORIDE 2 MG: 1 INJECTION, SOLUTION INTRAMUSCULAR; INTRAVENOUS at 07:15

## 2022-03-08 RX ADMIN — Medication 3 AMPULE: at 06:23

## 2022-03-08 RX ADMIN — BUPIVACAINE HYDROCHLORIDE 10 MG: 5 INJECTION, SOLUTION EPIDURAL; INTRACAUDAL; PERINEURAL at 07:05

## 2022-03-08 RX ADMIN — DEXAMETHASONE SODIUM PHOSPHATE 4 MG: 4 INJECTION, SOLUTION INTRAMUSCULAR; INTRAVENOUS at 08:23

## 2022-03-08 RX ADMIN — ONDANSETRON HYDROCHLORIDE 4 MG: 2 INJECTION, SOLUTION INTRAMUSCULAR; INTRAVENOUS at 08:23

## 2022-03-08 RX ADMIN — ACETAMINOPHEN 1000 MG: 500 TABLET ORAL at 11:43

## 2022-03-08 RX ADMIN — POLYETHYLENE GLYCOL 3350 17 G: 17 POWDER, FOR SOLUTION ORAL at 11:43

## 2022-03-08 RX ADMIN — KETAMINE HYDROCHLORIDE 10 MG: 100 INJECTION, SOLUTION, CONCENTRATE INTRAMUSCULAR; INTRAVENOUS at 07:50

## 2022-03-08 RX ADMIN — FENTANYL CITRATE 50 MCG: 50 INJECTION, SOLUTION INTRAMUSCULAR; INTRAVENOUS at 07:40

## 2022-03-08 RX ADMIN — PROPOFOL 50 MCG/KG/MIN: 10 INJECTION, EMULSION INTRAVENOUS at 07:31

## 2022-03-08 RX ADMIN — ASPIRIN 325 MG: 325 TABLET, COATED ORAL at 11:44

## 2022-03-08 RX ADMIN — FENTANYL CITRATE 25 MCG: 50 INJECTION, SOLUTION INTRAMUSCULAR; INTRAVENOUS at 07:23

## 2022-03-08 RX ADMIN — SODIUM CHLORIDE 125 ML/HR: 9 INJECTION, SOLUTION INTRAVENOUS at 09:11

## 2022-03-08 RX ADMIN — FENTANYL CITRATE 25 MCG: 50 INJECTION, SOLUTION INTRAMUSCULAR; INTRAVENOUS at 07:52

## 2022-03-08 RX ADMIN — FAMOTIDINE 20 MG: 20 TABLET, FILM COATED ORAL at 11:44

## 2022-03-08 NOTE — DISCHARGE INSTRUCTIONS
Discharge Instructions:  Danielle Mendoza    Surgery: LEFT TOTAL HIP ARTHROPLASTY DIRECT ANTERIOR APPROACH  Surgeon:   Katharine Bennett DO  Surgery Date:  3/8/2022    To relieve pain:   Use ice/gel packs.    -Put the ice pack directly over the wound, or anywhere you are hurting or swollen.   -To control pain and swelling, keep ice on regularly, especially after physical activity.  -The packs should stay cold for 3-4 hours. When it is not cold anymore, rotate with the packs in the freezer.  Elevate your leg. This will also keep swelling down.  Rest for at least 20 minutes between activity or exercises. To keep track of your pain medications, write down what you take and when you take it.  The last dose of pain medication you got in the hospital was:     Medication    Dose    Date & Time      Choose your medications based on the pain scale below:     To keep your pain under control, take Tylenol every 6 hours for 14 days - even if you feel like you dont need it.  For mild to moderate pain (4-6 on pain scale), take one pain pill every 4 hours or as instructed.  For severe pain (7-10 on pain scale), take two pain pills every 4 hours or as instructed.  To prevent nausea, take your pain medications with food. Pain Scale              As your pain lessens:     Slowly start taking less pain medication. You may do this by waiting longer between doses or by taking smaller doses.  Stop using the pain medications as soon as you no longer need it, usually in 2-3 weeks. Aspirin   To prevent blood clots, you will need to take Aspirin 325 mg twice a day for 30 days. To prevent stomach upset or bleeding:   Do not take non-steroidal anti-inflammatory medications (Ibuprofen, Advil, Motrin, Naproxen, etc.)    Take Pepcid 20 mg twice a day, or a similar home medication, while you are taking a blood thinner.           Keep your waterproof dressing in place. It will be removed by your surgeon during your follow-up appointment in 2 weeks.  You may need to change the dressing if you are having drainage to where the dressing is no longer intact. You will be given an extra dressing to use at home.  You will have some swelling, warmth, and bruising around the incision and up and down the leg after surgery. This will may get worse in the first few days you are home and will slowly get better over the next few weeks.  You may shower with this dressing over your wound. After showering pat the dressing dry.  DO NOT's:   Do not rub your surgical wound   Do not put lotion or oils on your wound.  Do not take a tub bath or go swimming until your doctor says it is ok. To increase and promote healing:   Stop Smoking (or at least cut back on smoking).  Eat a well-balanced diet. High in protein and Vitamin C.      If you have a poor appetite, supplement your diet by drinking Ensure, Glucerna or [place] Instant Breakfast for the next 30 days      If you are a diabetic, control you blood sugars to prevent infection and help your wound heal.                     Prevent Infection:     Wash your hands                       -This is the most important thing you or your caregiver can do. -Wash your hands with soap and water (or use the hand ) before you touch any wounds.  Shower  -Use the antibacterial soap we gave you when you take a shower.   -Shower with this soap until your wounds are healed.  Use Clean Sheets   -Put freshly cleaned sheets on your bed after surgery.   -To keep the surgery site clean, do not allow pets to sleep with you while your wound is still healing.  To prevent constipation, stay active and drink plenty of fluid.      While using pain medications, you should also take stool softeners and laxatives, such as Pericolace and Miralax.  If you are having too many bowel movements, then you may need to stop taking the laxatives.  You should have a bowel movement 3-4 days after surgery and then at least every other day while on pain medication.  To improve your recovery, you must be active!  Use your walker and take short walks (in your home) about every 2 hours during the day.  Try to increase how far you walk each day.  You can put as much weight on your leg as you can tolerate while walking.  Home health physical therapy will come to your home the day after you leave the hospital. The therapist will visit about 4 times over the next couple of weeks to teach you exercises, how to get out of bed and how to safely walk in your home.  NO DRIVING until your surgeon tells you it is ok.  You can return to work when cleared by a physician. Please call your physician immediately if you have:   Constant bleeding from your wound.  Increasing redness or swelling around your wound (some warmth, bruising and swelling is normal).  Change in wound drainage (increase in amount, color, or bad smell).  Change in mental status (unusual behavior).  Temperature over 101.5 degrees Fahrenheit    Pain or redness in the calf (back of your lower leg)   Increased swelling of the thigh, ankle, calf, or foot. Emergency! CALL [phone] if you have:   Shortness of breath   Chest pain when you cough or taking a deep breath         Please call your surgeons office at [phone] for a follow up appointment in 2 weeks.  You should call as soon as you get home or the next day after discharge.  If you have questions or concerns during normal business hours, you may reach Dr. Suma Dozier at [phone].         Instructions for 24 hours after receiving General Anesthesia or Intravenous Sedation,   and while taking prescription Narcotics    Common side effects associated with each of these medications includes:  - Drowsiness, dizziness, euphoria, sleepiness or confusion  - Impaired memory recall  - Unsteady gait, loss of fine muscle control and delayed reaction time  - Visual disturbances, difficulty focusing, blurred vision    You may experience some of these side effects or you may not be aware of subtle changes in your behavior or reaction time. Because you received these medications, we are giving you the following instructions. Discharge Instructions:   - Someone should be with you for the next 24 hours  - For your own safety, a responsible adult must drive you home  - Do not consume alcoholic beverages   - Do not make important personal, legal or business decisions for 24 hours  - Move slowly and carefully, do not make sudden position changes. Be alert for dizziness or lightheadedness and move accordingly. - Do not operate equipment for 24 hours - [employer], power tools, Kitchen accessories: stove, etc.  - If you have not urinated within 8 hours after discharge, please contact your surgeon's office.

## 2022-03-08 NOTE — ANESTHESIA PREPROCEDURE EVALUATION
Relevant Problems   No relevant active problems       Anesthetic History   No history of anesthetic complications            Review of Systems / Medical History  Patient summary reviewed, nursing notes reviewed and pertinent labs reviewed    Pulmonary        Sleep apnea: No treatment  Smoker (1ppd since 2010)    Pertinent negatives: No COPD and asthma     Neuro/Psych         Psychiatric history (anxiety)  Pertinent negatives: No seizures and CVA   Cardiovascular                Pertinent negatives: No hypertension, past MI and CHF  Exercise tolerance: >4 METS  Comments: EKG (03/2022): NSR   GI/Hepatic/Renal         Renal disease: stones       Endo/Other          Pertinent negatives: No diabetes and obesity   Other Findings   Comments: EtOH: 28 drinks/week         Physical Exam    Airway  Mallampati: II  TM Distance: > 6 cm  Neck ROM: normal range of motion   Mouth opening: Normal     Cardiovascular  Regular rate and rhythm,  S1 and S2 normal,  no murmur, click, rub, or gallop  Rhythm: regular  Rate: normal         Dental    Dentition: Poor dentition     Pulmonary  Breath sounds clear to auscultation               Abdominal  GI exam deferred       Other Findings            Anesthetic Plan    ASA: 2  Anesthesia type: spinal          Induction: Intravenous  Anesthetic plan and risks discussed with: Patient

## 2022-03-08 NOTE — PROGRESS NOTES
PHYSICAL THERAPY EVALUATION WITH DISCHARGE  Patient: Park Luna (89 y.o. male)  Date: 3/8/2022  Primary Diagnosis: Primary osteoarthritis of left hip [M16.12]  Unilateral primary osteoarthritis, left hip [M16.12]  Procedure(s) (LRB):  LEFT TOTAL HIP ARTHROPLASTY DIRECT ANTERIOR APPROACH (Left) Day of Surgery   Precautions: Left LE WBAT,Total hip (anterior)    ASSESSMENT  Based on the objective data described below, the patient presents with 4/10 pain left anterior hip due to surgery today, slight decrease in strength left LE as compared to right, decrease in activity tolerance as compared to stated baseline, and slight decrease in functional independence with gait/stairs > transfers and bed mobility. He is cooperative and motivated to participate with PT. Vitals are WNL in supine/sit/stand and oxygen saturation is WNL on room air throughout evaluation. He comes to sit with modified independence (he manipulates bed controls to raise head of bed slightly), and returns to supine independently with increased time/effort. He is independent for sit to/from stand and modified independent with bed to/from chair. He is supervision for safety with gait >/= 200 feet with rolling walker and with ascending/descending 4 stairs with bilateral rails. Patient performs car transfer with supervision only for verbal cues. Patient educated with pain management/activity pacing. Supportive wife is present throughout evaluation and will be able to provide support for patient in their home setting. He will benefit from HHPT to advance gait/assess home safety. Current Level of Function Impacting Discharge (mobility/balance): intact sitting balance, independent bed mobility and sit to/from stand, supervision with dynamic standing balance, gait, stairs, and car transfer    Functional Outcome Measure: The patient scored 80/100 on the Barthel outcome measure which is indicative of independence with ADL/functional mobility. Other factors to consider for discharge: supportive wife, owns equipment     Patient has cleared physical therapy, nursing and Ortho NP notified. PLAN :    Recommendation for discharge: (in order for the patient to meet his/her long term goals)  Physical therapy at least 2 days/week in the home AND ensure assist and/or supervision for safety with mobility as needed    This discharge recommendation:  A follow-up discussion with the attending provider and/or case management is planned    IF patient discharges home will need the following DME: patient owns DME required for discharge         SUBJECTIVE:   Patient stated Yes ma'am. Patient is very cooperative. OBJECTIVE DATA SUMMARY:   HISTORY:    Past Medical History:   Diagnosis Date    Chronic pain     Kidney stone     Psychiatric disorder     anxiety    Sleep apnea     no longer uses CPAP     Past Surgical History:   Procedure Laterality Date    HX HEENT      LASIK    HX KNEE ARTHROSCOPY Right     HX VASECTOMY         Personal factors and/or comorbidities impacting plan of care: none noted    Home Situation  Home Environment: Private residence  # Steps to Enter: 4  Rails to Enter: Yes  Hand Rails : Bilateral  One/Two Story Residence: One story  Living Alone: No  Support Systems: Spouse/Significant Other  Patient Expects to be Discharged to[de-identified] Home  Current DME Used/Available at Home: Cane, straight,Walker, rolling,Shower chair  Tub or Shower Type: Tub/Shower combination    EXAMINATION/PRESENTATION/DECISION MAKING:   Critical Behavior:  Neurologic State: Alert  Orientation Level: Oriented X4  Cognition: Appropriate safety awareness,Follows commands,Appropriate decision making,Appropriate for age attention/concentration  Safety/Judgement: Awareness of environment,Driving appropriateness,Fall prevention,Good awareness of safety precautions,Insight into deficits,Home safety  Hearing:   Auditory  Auditory Impairment: None  Skin:  IV right UE, incision intact left anterior hip  Edema: none noted  Range Of Motion:  AROM: Within functional limits                       Strength:    Strength: Generally decreased, functional                    Tone & Sensation:   Tone: Normal              Sensation: Intact               Coordination:  Coordination: Within functional limits      Functional Mobility:  Bed Mobility:     Supine to Sit: Modified independent  Sit to Supine: Independent; Additional time     Transfers:  Sit to Stand: Independent  Stand to Sit: Independent         Car Transfer: Supervision for verbal cues/technique           Balance:   Sitting: Intact  Standing: Intact; With support  Ambulation/Gait Training:  Distance (ft): 200 Feet (ft)  Assistive Device: Gait belt;Walker, rolling  Ambulation - Level of Assistance: Supervision;Assist x1        Gait Abnormalities: Decreased step clearance  Right Side Weight Bearing: Full  Left Side Weight Bearing: As tolerated  Base of Support: Shift to right     Speed/Tanika: Pace decreased (<100 feet/min)  Step Length: Left shortened;Right shortened        Stairs:  Number of Stairs Trained: 4  Stairs - Level of Assistance: Supervision;Assist X1   Rail Use: Both    Therapeutic Exercises:   Patient educated with motions to avoid (extreme hip extension, abduction, external rotation), as well as gentle bed exercise/activity pacing. Functional Measure:  Barthel Index:    Bathin  Bladder: 10  Bowels: 10  Groomin  Dressin  Feeding: 10  Mobility: 10  Stairs: 5  Toilet Use: 10  Transfer (Bed to Chair and Back): 10  Total: 80/100       The Barthel ADL Index: Guidelines  1. The index should be used as a record of what a patient does, not as a record of what a patient could do. 2. The main aim is to establish degree of independence from any help, physical or verbal, however minor and for whatever reason. 3. The need for supervision renders the patient not independent.   4. A patient's performance should be established using the best available evidence. Asking the patient, friends/relatives and nurses are the usual sources, but direct observation and common sense are also important. However direct testing is not needed. 5. Usually the patient's performance over the preceding 24-48 hours is important, but occasionally longer periods will be relevant. 6. Middle categories imply that the patient supplies over 50 per cent of the effort. 7. Use of aids to be independent is allowed. Score Interpretation (from [address] [facility] 83)    Independent   60-79 Minimally independent   40-59 Partially dependent   20-39 Very dependent   <20 Totally dependent     -Erick Whimtore., Barthel, D.W. (1965). Functional evaluation: the Barthel Index. [address] ([address] [place] [address] (1997). The Barthel activities of daily living index: self-reporting versus actual performance in the old (> or = 75 years). Journal of [address] [place] [address] 45(7), [facility], CHRISTOPEHR, Lorenzo Guzman., Vera Prater (1999). Measuring the change in disability after inpatient rehabilitation; comparison of the responsiveness of the Barthel Index and Functional [place] Measure. Journal of Neurology, Neurosurgery, and Psychiatry, 66(4), 392-216. Maria Teresa Salcedo, N.J.A, MAURO Silverio, & Cirilo Ca, M.A. (2004) Assessment of post-stroke quality of life in cost-effectiveness studies: The usefulness of the Barthel Index and the EuroQoL-5D.  Quality of Life Research, 15, 009-          Physical Therapy Evaluation Charge Determination   History Examination Presentation Decision-Making   LOW Complexity : Zero comorbidities / personal factors that will impact the outcome / POC LOW Complexity : 1-2 Standardized tests and measures addressing body structure, function, activity limitation and / or participation in recreation  LOW Complexity : Stable, uncomplicated  LOW Complexity : FOTO score of       Based on the above components, the patient evaluation is determined to be of the following complexity level: LOW     Pain Ratin/10 at rest and with activity, ice pack applied to left hip incision after mobility training    Activity Tolerance:   WNL, tolerates ADLs without rest breaks and SpO2 stable on RA    After treatment patient left in no apparent distress:   Sitting in chair-position in bed, Call bell within reach, Caregiver / family present and Side rails x 3    COMMUNICATION/EDUCATION:   The patients plan of care was discussed with: Occupational therapist, Registered nurse, and Orthopedic NP via text message. Fall prevention education was provided and the patient/caregiver indicated understanding., Patient/family have participated as able in goal setting and plan of care. and Patient/family agree to work toward stated goals and plan of care.     Thank you for this referral.  Ayesha Leggett, PT   Time Calculation: 38 mins

## 2022-03-08 NOTE — ANESTHESIA PROCEDURE NOTES
Spinal Block    Start time: 3/8/2022 7:00 AM  End time: 3/8/2022 7:07 AM  Performed by: Emerald Mejias MD  Authorized by: Emerald Mejias MD     Pre-procedure:   Indications: primary anesthetic  Preanesthetic Checklist: patient identified, risks and benefits discussed, anesthesia consent, site marked, patient being monitored and timeout performed    Timeout Time: 06:56 EST          Spinal Block:   Patient Position:  Seated  Prep Region:  Lumbar  Prep: DuraPrep      Location:  L2-3  Technique:  Single shot        Needle:   Needle Type:  Pencan  Needle Gauge:  25 G  Attempts:  1      Events: CSF confirmed, no blood with aspiration and no paresthesia        Assessment:  Insertion:  Uncomplicated  Patient tolerance:  Patient tolerated the procedure well with no immediate complications

## 2022-03-08 NOTE — DISCHARGE SUMMARY
Ortho Discharge Summary    Patient ID:  Lynette Osorio  [id number]  male  62 y.o.  1960    Admit date: 3/8/2022    Discharge date: 3/8/2022    Admitting Physician: Tala Otto DO     Consulting Physician(s):   Treatment Team: Attending Provider: Tato Esposito; Primary Nurse: Yasmin Bermudez    Date of Surgery:   3/8/2022     Preoperative Diagnosis:  Primary osteoarthritis of left hip [M16.12]    Postoperative Diagnosis:   Primary osteoarthritis of left hip [M16.12]    Procedure(s):   LEFT TOTAL HIP ARTHROPLASTY DIRECT ANTERIOR APPROACH     Anesthesia Type:   Spinal     Surgeon: Froilan Langford DO                            HPI:  Pt is a 62 y.o. male who has a history of Primary osteoarthritis of left hip [M16.12]  with pain and limitations of activities of daily living who presents at this time for a LEFT TOTAL HIP ARTHROPLASTY [address] 98 following the failure of conservative management. PMH:   Past Medical History:   Diagnosis Date    Chronic pain     Kidney stone     Psychiatric disorder     anxiety    Sleep apnea     no longer uses CPAP       Body mass index is 26.85 kg/m². : A BMI > 30 is classified as obesity and > 40 is classified as morbid obesity. Medications upon admission :   Prior to Admission Medications   Prescriptions Last Dose Informant Patient Reported? Taking?   escitalopram oxalate (LEXAPRO) 10 mg tablet 3/8/2022 at 0500 Self Yes Yes   Sig: Take 10 mg by mouth daily. Facility-Administered Medications: None        Allergies:  No Known Allergies     Hospital Course: The patient underwent surgery. Complications:  None; patient tolerated the procedure well. Was taken to the PACU in stable condition and then transferred to the ortho floor.       Perioperative Antibiotics:  Ancef     Postoperative Pain Management:  Oxycodone & Tylenol     DVT Prophylaxis: Aspirin 325 BID    Postoperative transfusions:    Number of units banked PRBCs =   none     Post Op complications: none    Hemoglobin at discharge:    Lab Results   Component Value Date/Time    HGB 15.8 03/01/2022 08:36 AM    INR 1.0 03/01/2022 08:36 AM       Dressing remained clean, dry and intact. No significant erythema or swelling. Wound appears to be healing without any evidence of infection. Neurovascular exam found to be within normal limits. Physical Therapy started following surgery and participated in bed mobility, transfers and ambulation. Gait:  Gait  Base of Support: Shift to right  Speed/Tanika: Pace decreased (<100 feet/min)  Step Length: Left shortened,Right shortened  Gait Abnormalities: Decreased step clearance  Ambulation - Level of Assistance: Supervision,Assist x1  Distance (ft): 200 Feet (ft)  Assistive Device: Gait belt,Walker, rolling  Rail Use: Both  Stairs - Level of Assistance: Supervision,Assist X1  Number of Stairs Trained: 4                   Discharged to: Home with . Condition on Discharge:   stable    Discharge instructions:  - Anticoagulate with Aspirin 325 BID  - Take pain medications as prescribed  - Resume pre hospital diet      - Discharge activity: activity as tolerated  - Ambulate with assistive device as needed. - Weight bearing status - WBAT  - Wound Care Keep wound clean and dry. See discharge instruction sheet. -DISCHARGE MEDICATION LIST     Current Discharge Medication List      START taking these medications    Details   acetaminophen (TYLENOL) 500 mg tablet Take 2 Tablets by mouth every six (6) hours for 7 days. Qty: 56 Tablet, Refills: 0  Start date: 3/8/2022, End date: 3/15/2022      aspirin delayed-release 325 mg tablet Take 1 Tablet by mouth two (2) times a day for 30 days. Qty: 60 Tablet, Refills: 0  Start date: 3/8/2022, End date: 4/7/2022      famotidine (PEPCID) 20 mg tablet Take 1 Tablet by mouth two (2) times a day for 30 days.   Qty: 60 Tablet, Refills: 0  Start date: 3/8/2022, End date: 4/7/2022      oxyCODONE IR (ROXICODONE) 5 mg immediate release tablet Take 1-2 Tablets by mouth every four (4) hours as needed for Pain for up to 7 days. Max Daily Amount: 60 mg. One tab for mild to moderate pain level 1-6, or 2 tabs for severe pain level 7-10  Qty: 30 Tablet, Refills: 0  Start date: 3/8/2022, End date: 3/15/2022    Associated Diagnoses: Status post total replacement of left hip      senna-docusate (PERICOLACE) 8.6-50 mg per tablet Take 1 Tablet by mouth two (2) times a day for 7 days. Qty: 14 Tablet, Refills: 0  Start date: 3/8/2022, End date: 3/15/2022         CONTINUE these medications which have NOT CHANGED    Details   escitalopram oxalate (LEXAPRO) 10 mg tablet Take 10 mg by mouth daily.           per medical continuation form      -Follow up in office in 2 weeks      Signed:  Og Vyas NP  Orthopaedic Nurse Practitioner    3/8/2022  12:58 PM

## 2022-03-08 NOTE — ANESTHESIA POSTPROCEDURE EVALUATION
Procedure(s):  LEFT TOTAL HIP ARTHROPLASTY DIRECT ANTERIOR APPROACH.    spinal    Anesthesia Post Evaluation      Multimodal analgesia: multimodal analgesia used between 6 hours prior to anesthesia start to PACU discharge  Patient location during evaluation: PACU  Patient participation: complete - patient participated  Level of consciousness: awake and alert  Pain management: adequate  Airway patency: patent  Anesthetic complications: no  Cardiovascular status: acceptable, hemodynamically stable and blood pressure returned to baseline  Respiratory status: acceptable and room air  Hydration status: euvolemic  Comments: Patient received a spinal. Regained a majority of his LE motor strength prior to transfer  Post anesthesia nausea and vomiting:  none  Final Post Anesthesia Temperature Assessment:  Normothermia (36.0-37.5 degrees C)      INITIAL Post-op Vital signs:   Vitals Value Taken Time   /87 03/08/22 1015   Temp 36.6 °C (97.8 °F) 03/08/22 0909   Pulse 76 03/08/22 1016   Resp 18 03/08/22 1016   SpO2 97 % 03/08/22 1016   Vitals shown include unvalidated device data.

## 2022-03-08 NOTE — PROGRESS NOTES
Ortho / Neurosurgery NP Note    POD# 0  s/p LEFT TOTAL HIP ARTHROPLASTY DIRECT ANTERIOR APPROACH   Pt seen with caregiver at bedside. Pt resting in bed. Awake and alert, in NAD. Reports minimal post-op pain, made aware of prn oxycodone. No other complaints. Tolerating regular diet. No nausea. VSS Afebrile. Room air. Visit Vitals  /84   Pulse 70   Temp 97.9 °F (36.6 °C)   Resp 16   Wt 89.8 kg (197 lb 15.6 oz)   SpO2 97%   BMI 26.85 kg/m²       Voiding status: due to void   Straight Cath  Straight Cath: Nurse performed cath (03/08/22 0908)  Number of Attempts: 1 (03/08/22 0908)  Catheter Size: 14 FR (03/08/22 0908)  Time Catheter Inserted: 4784 (03/08/22 0908)  Time Catheter Removed: 6794 (03/08/22 0908)  Urine: 75 mL (03/08/22 0908)      Labs    Lab Results   Component Value Date/Time    HGB 15.8 03/01/2022 08:36 AM      Lab Results   Component Value Date/Time    INR 1.0 03/01/2022 08:36 AM      Lab Results   Component Value Date/Time    Sodium 137 03/01/2022 08:36 AM    Potassium 4.3 03/01/2022 08:36 AM    Chloride 107 03/01/2022 08:36 AM    CO2 25 03/01/2022 08:36 AM    Glucose 117 (H) 03/01/2022 08:36 AM    BUN 13 03/01/2022 08:36 AM    Creatinine 0.87 03/01/2022 08:36 AM    Calcium 9.3 03/01/2022 08:36 AM     Recent Glucose Results: No results found for: GLU, GLUPOC, GLUCPOC        Body mass index is 26.85 kg/m². : A BMI > 30 is classified as obesity and > 40 is classified as morbid obesity. Dressing c.d.i  Cryotherapy in place over incision  Calves soft and supple;  No pain with passive stretch  Sensation and motor intact - PF/DF/EHL intact   SCDs for mechanical DVT proph while in bed     PLAN:  1) PT BID, OT - WBAT  2) Aspirin 325 mg PO BID for DVT Prophylaxis   3) GI Prophylaxis - Pepcid  4) Readniess for discharge:     [x] Vital Signs stable    [] + Voiding    [x] Wound intact, drainage minimal    [x] Tolerating PO intake     [] Cleared by PT (OT if applicable)     [] Stair training completed (if applicable)    [] Independent / Contact Guard Assist (household distance)     [] Bed mobility     [] Car transfers     [] ADLs    [x] Adequate pain control on oral medication alone     Home later today if able to void and clear therapy. Plans to return home with caregiver's support and HH.      Park Damico NP

## 2022-03-08 NOTE — PERIOP NOTES
TRANSFER - OUT REPORT:    Verbal report given to Ximena Zuluaga RN(name) on Tiffanie Schafer  being transferred to [facility](unit) for routine post - op       Report consisted of patients Situation, Background, Assessment and   Recommendations(SBAR). Information from the following report(s) OR Summary, Procedure Summary, Intake/Output and MAR was reviewed with the receiving nurse. Opportunity for questions and clarification was provided.       Patient transported with:   O2 @ 2 liters  Tech

## 2022-03-08 NOTE — OP NOTES
Date of Procedure: 3/8/2022  PREOPERATIVE DIAGNOSIS: Left hip osteoarthritis. POSTOPERATIVE DIAGNOSIS: same  OPERATION: Left total hip arthroplasty. ASSISTANT SURGEON: none  ANESTHESIA: spinal.  ESTIMATED BLOOD LOSS: 50 mL. COMPLICATIONS: None. SPECIMEN: None. DRAINS: None. IMPLANTS:     [place] Trident hemispherical acetabular shell 56 mm outer diameter   [place] trident X3 0 degree polyethylene insert   36 mm inner diameter   Biolox Delta ceramic v40 femoral head 36 mm outer diameter  +0 Neck length   Accolade 2, 127 degree neck angle, hip stem size 7 with a V40 taper. OPERATIVE INDICATIONS: This is a(an) 62 y.o. male who failed  nonoperative management of left hip osteoarthritis. We did discuss the risks  of surgery which include but are not limited to infection, nerve or blood  vessel damage, need for reoperation, disability, DVT, PE, postoperative pain,  swelling, stiffness, intra or postoperative fracture, leg length inequality    and postoperative dislocation, femoral and sciatic nerve palsies, lateral femoral  cutaneous nerve injury, wound healing complications, all other organ  disfunction. The patient did freely consent for surgery. DESCRIPTION OF PROCEDURE IN DETAIL: After the correct side and site were  identified by myself in the holding area, the patient was transported to the  surgical suite where after successful induction of spinal  anesthesia, the  patient was transferred to the [facility] table where all bony prominences were  padded. The left hip was then prepped and draped in usual sterile orthopedic  fashion. After an appropriate time out and confirmation the 2 grams of Ancef  had been infused prior to any incision, an incision was made beginning 2 cm  lateral to and distal to the ASIS directly laterally and distally by about 8  cm. The tissues were dissected sharply down to fascia and strict hemostasis  was maintained with the use of electrocautery.  The deep fascia was incised  sharply and the tensor of the fascia turner was bluntly elevated and mobilized. A superolateral retractor was then placed. The deep fascia was divided and the ascending branch of the lateral femoral circumflex artery was identified, coagulated and divided. An inferior medial retractor was then placed giving good visualization of the anterior  capsule. The iliocapsularis was then elevated off of the anterior capsule  and an anterior column retractor was placed. A capsulectomy was performed  anteriorly. Once the capsulectomy was performed,  retractors were placed intra-articularly and an osteotomy was performed in  the femoral neck in line with the preoperative template. Once this was  performed, the femoral head was removed with use of a powered corkscrew. The retractors were placed inside of capsule, outside of labrum and the  circumferential labrectomy was performed. The Pulvinar was excised with use  of electrocautery. The medial tissues were infused with 0.5% Ropivacaine. Once this was performed, a small reamer was then used to medialize  the acetabulum to its true floor. With that, the reamer was increased in  size and then placed in the direction of the anatomic direction of the  acetabulum. Further reaming was performed and care was taken to maintain good medial acetabular wall integrity. I reamed until the instrument had excellent  as well as a bed of bleeding cancellous bone. I, therefore, irrigated the acetabular recess and dried it with a lap and  impacted into place a final shell into place at   43 degrees of abduction as well as 15 degrees of anteversion under direct fluoroscopic visualization. I checked stability of the shell. It was excellent. With that, the  stability was tested and it was found to have an excellent scratch fit. I  therefore irrigated the wound and impacted into a place a 0 degree  polyethylene insert. This did have excellent engagement of the locking  mechanism. This was tested as well. The retractors were then removed and the leg was placed in extended, adducted, externally rotated position and retractors were placed about the proximal femur. The limited capsulotomy was performed at the greater trochanter and this did allow the proximal femur to  elevate up and out of the wound. I then used the box osteotome to clear off  metaphysical bone and the lateral cortical bone was then removed with the use of a rongeur. The canal entry broach was then used. Sequential broaching was then performed until I had good axial and rotational control. I used the calcar planer to clear off irregularities in the calcar. I trial reduced the hip with a 0 degree ball. X-rays were taken and  demonstrated excellent positioning of the femoral component. I redislocated the joint. I removed  the head and neck as well as the broach and impacted into place a  Final permanent Accolade 2 127 degree neck angle hip stem. This did have excellent axial and  rotational control. The calcar was checked and there were no cracks or other  bone deficiencies. I, therefore, trialed a ball which did have excellent  reconstitution of the length and offset. I, therefore, redislocated, cleaned  and dried the trunnion and impacted into place our final ball with seven sharp blows of the mallet. This did have excellent fixation. I therefore relocated the joint. Final  x-rays were taken demonstrating good positioning of all components as well as  stability and no fractures were noted. Stability testing was performed with the leg at neutral, then externally rotated 90 degrees, then externally rotated at 45 degrees combined with leg extension. The hip was stable through this range of motion. The wound was then copiously  irrigated with normal saline mixed with Betadine. Soft tissues were then  infused with 0.5% Ropivacaine. The fascia was then closed with a running #1  Vicryl suture. Deep stitches were placed. Then, 2-0 Vicryl, 3-0 Monocryl,  Dermabond, and Steri-Strips were applied. A sterile dressing was applied. The patient was then taken off of the table and taken to PACU in stable  condition with no obvious intraoperative complications. POSTOPERATIVE PLAN: The patient will be weightbearing as tolerated. They will  have ecotrin for DVT prophylaxis for  1 month. The patient will follow up in our office in 2 weeks and then 6 weeks for repeat clinical and  radiographic checks per our normal protocol.

## 2022-03-08 NOTE — H&P
Chief Complaint: Left hip pain     Assessment: Unilateral primary osteoarthritis of the left hip  (M16.12)      Plan: This patient and I did have a long discussion regarding the treatment options. Nonoperative management was discussed at length, continued pain control, physical therapy, injections, ambulatory aids, and weight loss. I did speak with the patient specifically that there are always some nonoperative options, and that surgery would be elective on their part. We did discuss the risks of surgery which include but are not limited to infection, nerve or blood vessel damage, femoral, lateral femoral cutaneous nerve injury, sciatic nerve injury, failure of fixation, failure of any possible implant, need for reoperation, postoperative pain and swelling, intra-or postoperative fracture, postoperative dislocation, leg length inequality, need for reoperation, implant failure, death, disability, organ dysfunction, wound healing issues, DVT, PE, and the need for further procedures. The patient did freely state their understanding and satisfaction with our discussion. We will proceed with a total hip arthroplasty after  medical clearances.     The patient was counseled at length about the risks of jayne Covid-19 during their perioperative period and any recovery window from their procedure.  The patient was made aware that jayne Covid-19  may worsen their prognosis for recovering from their procedure and lend to a higher morbidity and/or mortality risk.  All material risks, benefits, and reasonable alternatives including postponing the procedure were discussed. The patient does  wish to proceed with the procedure at this time.     HPI: This is a 62 y.o.male who complains of left hip pain which is activity dependent. The patient has had activity dependent pain for years. The patient has tried activity modification,  physical therapy, injections have not been attempted.   The pain is in the groin and distally, it is severe in intensity. The patient fears falling, walks with a limp, and feels as though all nonoperative management has failed. The patient denies any numbness, weakness, tingling, fevers, chills, nausea, vomiting, fevers, chills, nausea, vomiting.          Past Medical History:   Diagnosis Date    Kidney stone      Psychiatric disorder       anxiety               Past Surgical History:   Procedure Laterality Date    HX ORTHOPAEDIC         right knee    HX OTHER SURGICAL         vasectomy                Current Outpatient Medications on File Prior to Visit   Medication Sig Dispense Refill    escitalopram oxalate (LEXAPRO) 10 mg tablet Take 10 mg by mouth daily.          No current facility-administered medications on file prior to visit.         No Known Allergies     No family history on file.     Social History               Socioeconomic History    Marital status:    Tobacco Use    Smoking status: Current Every Day Smoker       Packs/day: 1.00    Smokeless tobacco: Never Used   Substance and Sexual Activity    Alcohol use: Yes       Alcohol/week: 28.0 - 41.0 standard drinks       Types: 28 - 41 Cans of beer per week    Drug use: No               Review of Systems:         General: Denies headache, lethargy, fever, weight loss  Ears/Nose/Throat: Denies ear discharge, drainage, nosebleeds, hoarse voice, dental problems  Cardiovascular: Denies chest pain, shortness of breath  Lungs: Denies chest pain, breathing problems, wheezing, pneumonia  Stomach: Denies stomach pain, heartburn, constipation, irritable bowel  Skin: Denies rash, sores, open wounds  Musculoskeletal: Admits to joint pain and swelling, this pain is sharp and causes difficulty walking.   This pain is in the groin on the left side, it is severe, made better by rest.   This pain causes a limp, admits to stiffness of the joint, decreased mobility, and  difficulty doing normal activities of daily living secondary to the pain. Genitourinary: Denies dysuria, hematuria, polyuria  Gastrointestinal: Denies constipation, obstipation, diarrhea  Neurological: Denies changes in sight, smell, hearing, taste, seizures. Denies loss of consciousness. Psychiatric: Denies depression, sleep pattern changes, anxiety, change in personality  Endocrine: Denies mood swings, heat or cold intolerance  Hematologic/Lymphatic: Denies anemia, purpura, petechia  Allergic/Immunologic: Denies swelling of throat, pain or swelling at lymph nodes        Physical Examination:        General: AOX3, no apparent distress  Psychiatric: mood and affect appropriate  Lungs: clear to auscultation bilaterally  Heart: regular rate and rhythm  Abdomen: no guarding  Head: normocephalic, atraumatic  Skin: No significant abnormalities, good turgor  Sensation intact to light touch: L1-S1 dermatomes  Muscular exam: 5/5 strength in all major muscle groups unless noted in specialty exam.    Extremities        Left upper extremity: Full active and passive range of motion without pain, deformity, no open wound, strength 5/5 in all major muscle groups.     Right upper extremity: Full active and passive range of motion without pain, deformity, no open wound, strength 5/5 in all major muscle groups.     Right lower extremity: Full active and passive range of motion without pain, deformity, no open wound, strength 5/5 in all major muscle groups.     Left lower extremity:  No deformity is noted. Circumduction of the hip causes significant pain in the groin, reproductive of the chief complaint. Range of motion is limited, with a positive impingement sign. ROMAN test causes some pain in the groin. Gait is antalgic. Slight tenderness to palpation on the lateral aspect of the hip. Sensation is intact to light touch in the L1-S1 dermatomes. Skin is intact about the hip.   Hip flexion and abduction strength is 4+/5, hip extension and knee extension as well as tibialis anterior and EHL/GCS are 5/5 strength.     Diagnostics:     Pertinent Xrays:  Pelvis and hip xrays indicate severe osteoarthritis of the left hip.    There are osteophytes at the femoral neck and head, as well as subchondral sclerosis of the acetabular rim.

## 2022-03-08 NOTE — PERIOP NOTES
Handoff Report from Operating Room to PACU    Report received from Isabela Knox RN and Daiana Brown CRNA regarding Minesh Maeve. Surgeon(s):  Alexa Ocampo DO  And Procedure(s) (LRB):  LEFT TOTAL HIP ARTHROPLASTY DIRECT ANTERIOR APPROACH (Left)  confirmed   with dressings discussed. Anesthesia type, drugs, patient history, complications, estimated blood loss, vital signs, intake and output, and last pain medication, lines and temperature were reviewed.

## 2022-03-08 NOTE — PROGRESS NOTES
OCCUPATIONAL THERAPY EVALUATION/DISCHARGE  Patient: Domonique Sanchez (58 y.o. male)  Date: 3/8/2022  Primary Diagnosis: Primary osteoarthritis of left hip [M16.12]  Unilateral primary osteoarthritis, left hip [M16.12]  Procedure(s) (LRB):  LEFT TOTAL HIP ARTHROPLASTY DIRECT ANTERIOR APPROACH (Left) Day of Surgery   Precautions:   WBAT,Total hip    ASSESSMENT  Based on the objective data described below, the patient presents just under his independent PLOF on POD #1 s/p L ALEIDA - anterior approach-  requiring only Min A with LLE bathing. dressing, however otherwise is performing BADL including related functional mobility with Mod I using RW. VSS on RA with activity. Pt very receptive to education to avoid extreme motion at L hip during functional tasks and related mobility. Pt competing toileting independently in room and tub transfer in ortho gym with Supervision and min verbal/visual cues to avoid extreme motion at L hip. Requiring only Mod A to don socks- anticipate this will progress with continued practice. Pt not in need of acute skilled OT, safe for D/C home and agree with plan for [facility]. OT will sign off. Current Level of Function (ADLs/self-care): Independent-  Up to Supervision with showering only     Functional Outcome Measure: The patient scored 80/100 on the Barthel Index outcome measure which is indicative of requiring up to 20% assist with ADL/related mobility. Other factors to consider for discharge: None- wife can assist at [address] :  Recommendation for discharge: (in order for the patient to meet his/her long term goals)  No skilled occupational therapy/ follow up rehabilitation needs identified at this time. This discharge recommendation:  Has been made in collaboration with the attending provider and/or case management    IF patient discharges home will need the following DME: none- issued RW. SUBJECTIVE:   Patient stated Melissa Latch you so much!     OBJECTIVE DATA SUMMARY: HISTORY:   Past Medical History:   Diagnosis Date    Chronic pain     Kidney stone     Psychiatric disorder     anxiety    Sleep apnea     no longer uses CPAP     Past Surgical History:   Procedure Laterality Date    HX HEENT      LASIK    HX KNEE ARTHROSCOPY Right     HX VASECTOMY         Prior Level of Function/Environment/Context: Works FT in logistics. Lived with wife yet did not require assist. Drives. Expanded or extensive additional review of patient history:     Home Situation  Home Environment: Private residence  # Steps to Enter: 4  Rails to Enter: Yes  Hand Rails : Bilateral  One/Two Story Residence: One story  Living Alone: No  Support Systems: Spouse/Significant Other  Patient Expects to be Discharged to[de-identified] Home  Current DME Used/Available at Home: Cane, straight,Walker, rolling,Shower chair  Tub or Shower Type: Tub/Shower combination    Hand dominance: Right    EXAMINATION OF PERFORMANCE DEFICITS:  Cognitive/Behavioral Status:  Neurologic State: Alert  Orientation Level: Oriented X4  Cognition: Appropriate safety awareness; Follows commands; Appropriate decision making; Appropriate for age attention/concentration  Perception: Appears intact  Perseveration: No perseveration noted  Safety/Judgement: Awareness of environment;Driving appropriateness; Fall prevention;Good awareness of safety precautions; Insight into deficits;Home safety    Skin: L hip surgical site intact    Edema: None observed    Hearing: Auditory  Auditory Impairment: None    Vision/Perceptual:                           Acuity: Within Defined Limits         Range of Motion:  AROM: Within functional limits                         Strength:  Strength: Generally decreased, functional                Coordination:  Coordination: Within functional limits  Fine Motor Skills-Upper: Left Intact; Right Intact    Gross Motor Skills-Upper: Left Intact; Right Intact    Tone & Sensation:  Tone: Normal  Sensation: Intact    Balance:  Sitting: Intact  Standing: Intact; With support    Functional Mobility and Transfers for ADLs:  Bed Mobility:  Supine to Sit: Modified independent  Sit to Supine: Independent; Additional time    Transfers:  Sit to Stand: Independent  Stand to Sit: Independent  Bed to Chair: Independent  Bathroom Mobility: Independent  Toilet Transfer : Independent  Assistive Device : Walker, rolling    ADL Assessment:  The patient recalled and demonstrated hip contraindications (avoid extreme motion) Left LE during ADLs and functional mobility with verbal/visual cues. Feeding: Independent    Oral Facial Hygiene/Grooming: Independent    Bathing: Minimum assistance (if showering)    Upper Body Dressing: Independent    Lower Body Dressing: Minimum assistance (with distal LLE dressing only)    Toileting: Independent                ADL Intervention and task modifications:  Lower Body Dressing Assistance  Socks: Moderate assistance (Indep for R, Max for L)  Leg Crossed Method Used: Yes (RLE only- bending fwd to don L)  Position Performed: Long sitting on bed         Cognitive Retraining  Safety/Judgement: Awareness of environment;Driving appropriateness; Fall prevention;Good awareness of safety precautions; Insight into deficits;Home safety    Bathing: Patient instructed when bathing to not submerge wound in water, stand to shower or sponge bathe, cover wound with plastic and tape to ensure no water reaches bandage/wound without cues. Patient indicated understanding. Dressing joint: Patient instructed and demonstrated to don/doff Left LE first/last verbal cues. Patient instructed and demonstrated to don all clothing while sitting prior to standing, doff all clothing to knees while standing, then sit to doff clothing off from knees to feet in order to facilitate fall prevention, pain management.     Home safety: Patient instructed on home modifications and safety (raise height of ADL objects, appropriate height of chair surfaces, recliner safety, change of floor surfaces, clear pathways) to increase independence and fall prevention. Patient indicated understanding. Standing: Patient instructed and demonstrated to walk up to sink/counter top/surfaces, step into walker to increase safety of joint and fall prevention. Instructed to apply concept of hip contraindications to ADLs within the home (no extreme reaching across body to Right side, square off while using objects, slide objects along surfaces). Patient instructed to increase amount of time standing, observe standing position during ADLs in order to increase even weight bearing through bilateral LEs in order to increase independence with ADLs. Goal to be reached 30 days post - op, per orthopedic surgeon or per PT. Patient indicated understanding. Tub transfer: Patient instructed regarding when it is safe to begin transfer into tub (complete stairs with PT, advance exercises with PT high enough to clear tub height). Patient instructed to use the same technique as used with stairs when entering and exiting tub (\"up with the non-surgical, down with the surgical leg\"). Patient indicated understanding. Functional Measure:    Barthel Index:  Bathin  Bladder: 10  Bowels: 10  Groomin  Dressin  Feeding: 10  Mobility: 10  Stairs: 5  Toilet Use: 10  Transfer (Bed to Chair and Back): 10  Total: 80/100      The Barthel ADL Index: Guidelines  1. The index should be used as a record of what a patient does, not as a record of what a patient could do. 2. The main aim is to establish degree of independence from any help, physical or verbal, however minor and for whatever reason. 3. The need for supervision renders the patient not independent. 4. A patient's performance should be established using the best available evidence. Asking the patient, friends/relatives and nurses are the usual sources, but direct observation and common sense are also important. However direct testing is not needed.   5. Usually the patient's performance over the preceding 24-48 hours is important, but occasionally longer periods will be relevant. 6. Middle categories imply that the patient supplies over 50 per cent of the effort. 7. Use of aids to be independent is allowed. Score Interpretation (from [address] [facility])    Independent   60-79 Minimally independent   40-59 Partially dependent   20-39 Very dependent   <20 Totally dependent     -Erick Whitmore., BarthelWEN. (1965). Functional evaluation: the Barthel Index. [address] ([address] [place] (). The Barthel activities of daily living index: self-reporting versus actual performance in the old (> or = 75 years). Journal of [address] [place] [address] 45(7), [facility], CHRISTOPHER, Luly Tejada., Luisito Wright. (1999). Measuring the change in disability after inpatient rehabilitation; comparison of the responsiveness of the Barthel Index and Functional [place] Measure. Journal of Neurology, Neurosurgery, and Psychiatry, 66(4), 497-925. Tk Irizarry, [place].A, MAURO Silverio, & Duke Foster MRANDY. (2004) Assessment of post-stroke quality of life in cost-effectiveness studies: The usefulness of the Barthel Index and the EuroQoL-5D. Quality of Life Research, 15, 111-88       Occupational Therapy Evaluation Charge Determination   History Examination Decision-Making   LOW Complexity : Brief history review  LOW Complexity : 1-3 performance deficits relating to physical, cognitive , or psychosocial skils that result in activity limitations and / or participation restrictions  LOW Complexity : No comorbidities that affect functional and no verbal or physical assistance needed to complete eval tasks       Based on the above components, the patient evaluation is determined to be of the following complexity level: LOW   Pain Ratin/10 pain at L hip with activity.      Activity Tolerance:   Good      After treatment patient left in no apparent distress:    Bed in chair, eating lunch with wife present. COMMUNICATION/EDUCATION:   The patients plan of care was discussed with: Physical therapist, Registered nurse and Patient.      Thank you for this referral.  Nayely Zhu OTR/L  Time Calculation: 31 mins

## 2022-03-08 NOTE — PERIOP NOTES
Irrisept Wound Debridement and Cleansing System  Ref: [place]: [id number] LOT: [id number] Expiration Date: 10/31/2023

## 2022-03-08 NOTE — PROGRESS NOTES
Transition of Care Plan:  RUR: n/a outpatient status   Disposition: home with outpatient therapy   Follow up appointments: ortho  DME needed: pt owns dme needed for d/c  Transportation at Discharge: friend- at bedside  [place] or means to access home: yes      IM Medicare Letter: n/a commercial insurance   Is patient a BCPI-A Bundle: no   If yes, was Bundle Letter given?:    Is patient a [place] and connected with the  [address]                If yes, was [employer] transfer form completed and [facility] notified? Caregiver Contact: friend Chris Meraz [phone]  Discharge Caregiver contacted prior to discharge? yes  Care Conference needed?: no    Reason for Admission: left total hip arthroplasty                   RUR Score: n/a outpatient status                     Plan for utilizing home health: per recommendation          PCP: First and Last name:  Sherman Patrick MD   Name of Practice: [facility]    Are you a current patient: Yes/No: yes   Approximate date of last visit: 3/2/22   Can you participate in a virtual visit with your PCP: yes                    Current Advanced Directive/Advance Care Plan: [place] (ACP) Conversation      Date of Conversation: 3/8/22  Conducted with: Patient with Decision Making Capacity  Content/Action Overview:   DECLINED ACP conversation - will revisit periodically     Healthcare Decision Maker:   Click here to complete [address] including selection of the Healthcare Decision Maker Relationship (ie \"Primary\")                      Transition of Care Plan:                      CM made room visit with patient who was alert and oriented. Pt confirmed demographics, insurance, and emergency contact on file. Pt uses [facility]. Pt and friend,Floresita, live in a single level home with 4 gurpreet. Pt has a cane and RW. At baseline pt is independent with ADLs and driving. Pt has no hx of HH, SNF, or IPR.  Pt's plan is to d/c home with home health. Pt voiced no preference in [facility] agency. Referral sent to [facility] and declined. Multiple referrals sent via [facility] and declined due to staffing shortage or insurance. CM spoke with ortho NP who reported pt would be a good candidate for outpatient therapy. CM provided patient with outpatient therapy script written by ortho NP and explained pt would need to contact outpatient therapy office for appointment first thing in the morning if he does not receive a phone call from a [facility] agency by this evening. Pt and Pt's friend at bedside both provided verbal understanding. Care Management Interventions  PCP Verified by CM: Yes  Mode of Transport at Discharge:  Other (see comment) (friend )  Transition of Care Consult (CM Consult): Discharge Planning  Physical Therapy Consult: Yes  Occupational Therapy Consult: Yes  Speech Therapy Consult: No  Support Systems: Friend/Neighbor  Confirm Follow Up Transport: Family  Discharge Location  Patient Expects to be Discharged to[de-identified] Home with outpatient services    Edna Metzger, [address]

## 2022-03-11 NOTE — PROGRESS NOTES
Patient Name: Neal Villegas  Date:3/14/2022  : 1960  [x]  Patient  Verified  Payor: Edgard Domínguez / Plan: [address] PPO / Product Type: PPO /    Total Treatment Time (min): 45  1:1 Treatment Time ( only):    Melisa Henry DO  1. Primary osteoarthritis of left hip  2. Acute postoperative pain of left hip        Subjective:    Patient is a 62 y.o. male referred to physical therapy by Dr. Clara Whitlock with a diagnosis of a left total hip replacement using anterior approach performed on 2022. Patient reports a progressive history of chronic hip pain with decreasing function prior to surgery. He states that postoperative pain is a 3-4/10. He states he did not receive home health physical therapy. He has been working on some exercises given to him preoperatively as able. He is still ambulating with a rolling walker. 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 He would like to return to riding his motorcycle. He does have 4 steps to enter his home. He would like to be more comfortable sleeping at night as well. He has noticed some new right hip pain since his left hip surgery. He is having some mild left knee and shin pain as well. He does smoke approximately 1 pack/cigarettes a day. Past medical history and medication list can otherwise be reviewed per the EHR. Objective:    Patient ambulates in the clinic with a slow but steady gait pattern using a rolling walker. Does have a shortened step/stride length with limited left hip extension but gradually does improve with time and ambulation here in the clinic. He is able to perform a sit to stand transfer without upper extremity assistance. Passive right hip flexion to 100 degrees. Passive left hip flexion 90 degrees. He is able to produce an isometric quad contraction without complaints of pain. No straight leg raise performed here in clinic today.   Incision appears to be intact and healing well without current signs of infection. Appropriate soft tissue/trigger point type restrictions throughout the proximal thigh and hip musculature. Ex: 25 min  Treatment today to include instruction in a home exercise program as well as providing patient with written and visual handouts. Manual assisted passive range of motion exercises for the left hip performed into all directions as appropriate and tolerated    Man:  min    NMR:  min      All questions were addressed. Assessment:    Patient presents with increased pain and decreased ROM, strength, and mobility consistent with left total hip replacement using anterior approach performed on March 8 of 2022. Long Term Goals. 8 weeks  1. Patient will report left hip pain to be consistently less than or equal to 1/10 with home/work/community/recreational ADL activities. 2.  Patient will demonstrate the ability to ambulate on level surfaces, uneven surfaces, stairs with a smooth and nonantalgic gait pattern without the need for assistive device. 3.  Patient will report the ability to return to regular sleep or recreational ADL activity to include riding his motorcycle without instability. Short Term Goals. 2 visits. 1. Patient will demonstrate independence with HEP. Plan:  Plan of care: Physical therapy consisted of frequency of 2/week for the next 8 weeks. Physical therapy will consist of therapeutic exercise, modalities, patient education, neuromuscular reeducation, manual therapy, therapeutic activity, dry needling, and instruction in home exercise program as appropriate. Eval  Ex: 25  Man:   NMR:     The referring physician has reviewed and approved this evaluation and plan of care as noted by the electronic signature attached to note.     Ronnie Murphy DPT, ATC

## 2022-03-14 ENCOUNTER — OFFICE VISIT (OUTPATIENT)
Dept: ORTHOPEDIC SURGERY | Age: 62
End: 2022-03-14
Payer: COMMERCIAL

## 2022-03-14 DIAGNOSIS — G89.18 ACUTE POSTOPERATIVE PAIN OF LEFT HIP: ICD-10-CM

## 2022-03-14 DIAGNOSIS — M25.552 ACUTE POSTOPERATIVE PAIN OF LEFT HIP: ICD-10-CM

## 2022-03-14 DIAGNOSIS — M16.12 PRIMARY OSTEOARTHRITIS OF LEFT HIP: Primary | ICD-10-CM

## 2022-03-14 PROCEDURE — 97162 PT EVAL MOD COMPLEX 30 MIN: CPT | Performed by: PHYSICAL THERAPIST

## 2022-03-14 PROCEDURE — 97110 THERAPEUTIC EXERCISES: CPT | Performed by: PHYSICAL THERAPIST

## 2022-03-16 NOTE — PROGRESS NOTES
Patient Name: Lynette Osorio  Date:3/18/2022  : 1960  [x]  Patient  Verified  Payor: Jay Rosario / Plan: [address] PPO / Product Type: PPO /    Total Treatment Time (min): 60  1:1 Treatment Time ( [address] only):   Referring provider: Froilan Langford, DO  1. Primary osteoarthritis of left hip  2. Acute postoperative pain of left hip      SUBJECTIVE  Patient verbalizes compliance with HEP. States pain/soreness are already somewhat improved but certainly not eliminated. Has practiced with single point cane at home a bit. OBJECTIVE  Modality:   []  E-Stim: type _ x _ min     []att   []unatt   []w/US   []w/ice   []w/heat  []  Ultrasound: []cont   []pulse    _ W/cm2 x _  min   []1MHz   []3MHz  []  Ice pack _  Post     declines  [] Hot pack _  Pre       []  Other:    Man: 15 min  Manual and IA myofascial techniques to the anterior/lateral/proximal hip and thigh in varying angles of hip flexion to neutral position. NMR: 15 min  PNF contract relax techniques for hip flexion/extension while patient in supine. Ex: 30 min  Therapeutic exercise/proprioceptive training/neuromuscular reeducation/therapeutic activity completed here in clinic today per the exercise log. Manual assisted passive flexibility exercises for the hamstrings/adductors/iliopsoas while patient in supine. PT Exercise Log         EXERCISE 3/18/2022   SAQ 4#   Sliders y   [employer] y   cups y   Hip ER with Tband green                                                                    Ex: 30 min  Man: 15 min  NMR: 15 min    ASSESSMENT  [x]  See Plan of Care  [x]  Patient will continue to benefit from skilled therapy to address remaining functional deficits:   Gait improved by end of today's session with better functional hip extension allowing longer step/stride. Appropriate fatigue with today's exercises. Still benefits with rolling walker for safety.     PLAN  Continue with current plan of care and progress as appropriate towards functional goals.   [x]  Upgrade activities as tolerated     [x]  Continue plan of care  []  Discharge due to:_  [] Other:_       Lakshmi Whitt PT, DPT  3/18/2022    11:34 AM

## 2022-03-17 DIAGNOSIS — Z47.1 AFTERCARE FOLLOWING LEFT HIP JOINT REPLACEMENT SURGERY: Primary | ICD-10-CM

## 2022-03-17 DIAGNOSIS — Z96.642 AFTERCARE FOLLOWING LEFT HIP JOINT REPLACEMENT SURGERY: Primary | ICD-10-CM

## 2022-03-18 ENCOUNTER — OFFICE VISIT (OUTPATIENT)
Dept: ORTHOPEDIC SURGERY | Age: 62
End: 2022-03-18
Payer: COMMERCIAL

## 2022-03-18 DIAGNOSIS — M16.12 PRIMARY OSTEOARTHRITIS OF LEFT HIP: Primary | ICD-10-CM

## 2022-03-18 DIAGNOSIS — G89.18 ACUTE POSTOPERATIVE PAIN OF LEFT HIP: ICD-10-CM

## 2022-03-18 DIAGNOSIS — M25.552 ACUTE POSTOPERATIVE PAIN OF LEFT HIP: ICD-10-CM

## 2022-03-18 PROCEDURE — 97110 THERAPEUTIC EXERCISES: CPT | Performed by: PHYSICAL THERAPIST

## 2022-03-18 PROCEDURE — 97112 NEUROMUSCULAR REEDUCATION: CPT | Performed by: PHYSICAL THERAPIST

## 2022-03-18 PROCEDURE — 97140 MANUAL THERAPY 1/> REGIONS: CPT | Performed by: PHYSICAL THERAPIST

## 2022-03-19 PROBLEM — M16.12 UNILATERAL PRIMARY OSTEOARTHRITIS, LEFT HIP: Status: ACTIVE | Noted: 2022-03-08

## 2022-03-21 ENCOUNTER — HOSPITAL ENCOUNTER (OUTPATIENT)
Dept: GENERAL RADIOLOGY | Age: 62
Discharge: HOME | End: 2022-03-21
Payer: COMMERCIAL

## 2022-03-21 ENCOUNTER — OFFICE VISIT (OUTPATIENT)
Dept: ORTHOPEDIC SURGERY | Age: 62
End: 2022-03-21
Payer: COMMERCIAL

## 2022-03-21 DIAGNOSIS — M16.12 PRIMARY OSTEOARTHRITIS OF LEFT HIP: Primary | ICD-10-CM

## 2022-03-21 DIAGNOSIS — Z47.1 AFTERCARE FOLLOWING LEFT HIP JOINT REPLACEMENT SURGERY: ICD-10-CM

## 2022-03-21 DIAGNOSIS — M25.552 ACUTE POSTOPERATIVE PAIN OF LEFT HIP: ICD-10-CM

## 2022-03-21 DIAGNOSIS — Z96.642 AFTERCARE FOLLOWING LEFT HIP JOINT REPLACEMENT SURGERY: ICD-10-CM

## 2022-03-21 DIAGNOSIS — G89.18 ACUTE POSTOPERATIVE PAIN OF LEFT HIP: ICD-10-CM

## 2022-03-21 PROCEDURE — 97140 MANUAL THERAPY 1/> REGIONS: CPT | Performed by: PHYSICAL THERAPIST

## 2022-03-21 PROCEDURE — 97112 NEUROMUSCULAR REEDUCATION: CPT | Performed by: PHYSICAL THERAPIST

## 2022-03-21 PROCEDURE — 97110 THERAPEUTIC EXERCISES: CPT | Performed by: PHYSICAL THERAPIST

## 2022-03-21 PROCEDURE — 73502 X-RAY EXAM HIP UNI 2-3 VIEWS: CPT

## 2022-03-22 ENCOUNTER — OFFICE VISIT (OUTPATIENT)
Dept: ORTHOPEDIC SURGERY | Age: 62
End: 2022-03-22
Payer: COMMERCIAL

## 2022-03-22 VITALS
HEIGHT: 72 IN | OXYGEN SATURATION: 98 % | WEIGHT: 200 LBS | BODY MASS INDEX: 27.09 KG/M2 | DIASTOLIC BLOOD PRESSURE: 89 MMHG | SYSTOLIC BLOOD PRESSURE: 155 MMHG | TEMPERATURE: 98.4 F | HEART RATE: 67 BPM

## 2022-03-22 DIAGNOSIS — Z47.1 AFTERCARE FOLLOWING LEFT HIP JOINT REPLACEMENT SURGERY: Primary | ICD-10-CM

## 2022-03-22 DIAGNOSIS — Z96.642 AFTERCARE FOLLOWING LEFT HIP JOINT REPLACEMENT SURGERY: Primary | ICD-10-CM

## 2022-03-22 PROCEDURE — 99024 POSTOP FOLLOW-UP VISIT: CPT | Performed by: ORTHOPAEDIC SURGERY

## 2022-03-22 NOTE — LETTER
3/23/2022    Patient: Ismael Mendoza   YOB: 1960   Date of Visit: 3/22/2022     Hattie Krabbe, MD  [facility] [address]  Via Fax: [phone]    Dear Hattie Krabbe, MD,      Thank you for referring Mr. Jesús Lin to [facility] for evaluation. My notes for this consultation are attached. If you have questions, please do not hesitate to call me. I look forward to following your patient along with you.       Sincerely,    Viviana Vargas, DO

## 2022-03-22 NOTE — PROGRESS NOTES
Identified pt with two pt identifiers (name and ). Reviewed chart in preparation for visit and have obtained necessary documentation. Neal Villegas is a 62 y.o. male  Chief Complaint   Patient presents with    Surgical Follow-up     LT hip     Visit Vitals  BP (!) 155/89 (BP 1 Location: Right arm, BP Patient Position: Sitting, BP Cuff Size: Large adult)   Pulse 67   Temp 98.4 °F (36.9 °C) (Tympanic)   Ht 6' (1.829 m)   Wt 200 lb (90.7 kg)   SpO2 98%   BMI 27.12 kg/m²     1. Have you been to the ER, urgent care clinic since your last visit? Hospitalized since your last visit? No    2. Have you seen or consulted any other health care providers outside of the [address] [place] [address] since your last visit? Include any pap smears or colon screening.  No

## 2022-03-23 NOTE — PROGRESS NOTES
is here for a follow up visit from a total hip arthroplasty on the left side. The patient is doing well, with no medical complications since discharge. Pain has been appropriate since surgery,and the patient is progressing well with physical therapy. Current Outpatient Medications on File Prior to Visit   Medication Sig Dispense Refill    aspirin delayed-release 325 mg tablet Take 1 Tablet by mouth two (2) times a day for 30 days. 60 Tablet 0    famotidine (PEPCID) 20 mg tablet Take 1 Tablet by mouth two (2) times a day for 30 days. 60 Tablet 0    escitalopram oxalate (LEXAPRO) 10 mg tablet Take 10 mg by mouth daily. No current facility-administered medications on file prior to visit. ROS:  General: denies agitation, major chest pain, unexpected weakness  Pain in the hip is managed with no medication except Tylenol. Skin: healing wound is without issue. Strength: appropriate weakness of involved extremity is resolving since surgery      Physical Examination:    Blood pressure (!) 155/89, pulse 67, temperature 98.4 °F (36.9 °C), temperature source Tympanic, height 6' (1.829 m), weight 200 lb (90.7 kg), SpO2 98 %. Skin: no significant drainage, no wound dehiscence  Sensation intact to light touch at level of wound and distally. Lateral femoral cutaneous nerve function is intact  Strength is 4/5 with hip flexion and abduction  Leg lengths are clinically equal  Distal swelling is noted, but appropriate for postoperative course  Distal capillary refill less than 2 seconds      Imaging:    Postoperative xrays are reviewed, they indicate a left total hip arthroplasty in excellent position without evidence of loosening or failure. Leg lengths are equal through the hip.       Assessment: Status post left total hip arthroplasty    Plan:  Patient will continue physical therapy, with the goal of outpatient therapy and then home exercise program as soon as is possible  Wound care and dental prophylaxis instructions were reviewed  Continue to weight bear as tolerated without restriction, except to keep to the positions of comfort  Deep Venous Thrombosis Prophylaxis: Aspirin  Follow up will be 4 weeks, left hip xrays on follow up

## 2022-03-24 ENCOUNTER — OFFICE VISIT (OUTPATIENT)
Dept: ORTHOPEDIC SURGERY | Age: 62
End: 2022-03-24
Payer: COMMERCIAL

## 2022-03-24 DIAGNOSIS — M25.552 ACUTE POSTOPERATIVE PAIN OF LEFT HIP: ICD-10-CM

## 2022-03-24 DIAGNOSIS — G89.18 ACUTE POSTOPERATIVE PAIN OF LEFT HIP: ICD-10-CM

## 2022-03-24 DIAGNOSIS — M16.12 PRIMARY OSTEOARTHRITIS OF LEFT HIP: Primary | ICD-10-CM

## 2022-03-24 PROCEDURE — 97110 THERAPEUTIC EXERCISES: CPT | Performed by: PHYSICAL THERAPIST

## 2022-03-24 PROCEDURE — 97140 MANUAL THERAPY 1/> REGIONS: CPT | Performed by: PHYSICAL THERAPIST

## 2022-03-24 PROCEDURE — 97112 NEUROMUSCULAR REEDUCATION: CPT | Performed by: PHYSICAL THERAPIST

## 2022-03-24 NOTE — PROGRESS NOTES
Patient Name: Roscoe Donahue  Date:3/24/2022  : 1960  [x]  Patient  Verified  Payor: Angel Oats / Plan: [address] PPO / Product Type: PPO /    Total Treatment Time (min): 60  1:1 Treatment Time ( [address] only):   Referring provider: Buck Gonzales, DO  1. Primary osteoarthritis of left hip  2. Acute postoperative pain of left hip      SUBJECTIVE  MD approves continued PT and allowed patient to return to work yesterday. Patient states he feels good. Rates pain this AM 1/10. Getting better at reaching into trunk flexion to don shoes. Still uncomfortable at times with sleeping at night. OBJECTIVE  Modality:   []  E-Stim: type _ x _ min     []att   []unatt   []w/US   []w/ice   []w/heat  []  Ultrasound: []cont   []pulse    _ W/cm2 x _  min   []1MHz   []3MHz  []  Ice pack _  Post     declines  [] Hot pack _  Pre       []  Other:    Man: 15 min  Manual and IA myofascial techniques to the anterior/lateral/proximal hip and thigh in varying angles of hip flexion to neutral position. NMR: 15 min  Manual assisted neuromuscular down regulation techniques to the proximal thigh, iliopsoas. Added neuromuscular activity to gym therex today per the exercise log. Ex: 30 min  Therapeutic exercise/proprioceptive training/neuromuscular reeducation/therapeutic activity completed here in clinic today per the exercise log. Manual assisted passive flexibility exercises for the hamstrings/adductors/iliopsoas while patient in supine. PT Exercise Log         EXERCISE 3/24/2022   [facility] 6#   Sliders y   [employer] y   Cups (nmr) y   Bridge with Tband green   TKE green   Balance board (nmr) y   Lat Walk green   Nu-step 10'                                              External cues utilized with single point cane to limit side trunk shift antalgia.        Ex: 30 min  Man: 15 min  NMR: 15 min    ASSESSMENT  [x]  See Plan of Care  [x]  Patient will continue to benefit from skilled therapy to address remaining functional deficits:   Good ROM. Increasing exercise well for this point in rehab timeline. Still with mild side trunk shift during gait contributing to antalgia. PLAN  Continue with current plan of care and progress as appropriate towards functional goals.   [x]  Upgrade activities as tolerated     [x]  Continue plan of care  []  Discharge due to:_  [] Other:_       Bindu Nathan PT, DPT  3/24/2022    11:34 AM

## 2022-03-28 ENCOUNTER — OFFICE VISIT (OUTPATIENT)
Dept: ORTHOPEDIC SURGERY | Age: 62
End: 2022-03-28
Payer: COMMERCIAL

## 2022-03-28 DIAGNOSIS — M16.12 PRIMARY OSTEOARTHRITIS OF LEFT HIP: Primary | ICD-10-CM

## 2022-03-28 DIAGNOSIS — G89.18 ACUTE POSTOPERATIVE PAIN OF LEFT HIP: ICD-10-CM

## 2022-03-28 DIAGNOSIS — M25.552 ACUTE POSTOPERATIVE PAIN OF LEFT HIP: ICD-10-CM

## 2022-03-28 PROCEDURE — 97110 THERAPEUTIC EXERCISES: CPT | Performed by: PHYSICAL THERAPIST

## 2022-03-28 PROCEDURE — 97140 MANUAL THERAPY 1/> REGIONS: CPT | Performed by: PHYSICAL THERAPIST

## 2022-03-28 PROCEDURE — 97112 NEUROMUSCULAR REEDUCATION: CPT | Performed by: PHYSICAL THERAPIST

## 2022-03-28 NOTE — PROGRESS NOTES
Patient Name: Wolf Harris  Date:3/28/2022  : 1960  [x]  Patient  Verified  Payor: Ferny Aguirre / Plan: [address] PPO / Product Type: PPO /    Total Treatment Time (min): 60  1:1 Treatment Time ( [address] only):   Referring provider: Santiago Dye, DO  1. Primary osteoarthritis of left hip  2. Acute postoperative pain of left hip      SUBJECTIVE  Patient reports adductors have been sore with increased exercise after last session. OBJECTIVE  Modality:   []  E-Stim: type _ x _ min     []att   []unatt   []w/US   []w/ice   []w/heat  []  Ultrasound: []cont   []pulse    _ W/cm2 x _  min   []1MHz   []3MHz  []  Ice pack _  Post       [] Hot pack _  Pre       []  Other:    Man: 15 min  Mobilization of the hip into all directions as protocol and tolerance allow. NMR: 15 min  Manual assisted neuromuscular down regulation techniques to the adductors, iliopsoas, and anterior thigh with instructions for completion at home as well. Ex: 30 min  Therapeutic exercise/proprioceptive training/neuromuscular reeducation/therapeutic activity completed here in clinic today per the exercise log. PT Exercise Log         EXERCISE 3/28/2022   LAQ 6#   slider y   [employer] y   Cups  (nmr) y   Bridge with Tband green   TKE green   Nu-step Level 2.0  10'   Balance board  (nmr) y   Lat walk green   A-P Gait green                                                Ex: 30 min  Man: 15 min  NMR: 15 min    ASSESSMENT  [x]  See Plan of Care  [x]  Patient will continue to benefit from skilled therapy to address remaining functional deficits:   Muscular soreness with basic exercise but overall patient continues to progress well utilizing current plan of care. Able to ambulate short distances here in the clinic without assistive device however patient still prefers single-point cane for safety.     PLAN  Continue with current plan of care and progress as appropriate towards functional goals.  [x]  Upgrade activities as tolerated     [x]  Continue plan of care  []  Discharge due to:_  [] Other:_       Suraj Hamm, PT, DPT  3/28/2022    4:15 PM

## 2022-03-30 ENCOUNTER — OFFICE VISIT (OUTPATIENT)
Dept: ORTHOPEDIC SURGERY | Age: 62
End: 2022-03-30
Payer: COMMERCIAL

## 2022-03-30 DIAGNOSIS — G89.18 ACUTE POSTOPERATIVE PAIN OF LEFT HIP: ICD-10-CM

## 2022-03-30 DIAGNOSIS — M25.552 ACUTE POSTOPERATIVE PAIN OF LEFT HIP: ICD-10-CM

## 2022-03-30 DIAGNOSIS — M16.12 PRIMARY OSTEOARTHRITIS OF LEFT HIP: Primary | ICD-10-CM

## 2022-03-30 PROCEDURE — 97112 NEUROMUSCULAR REEDUCATION: CPT

## 2022-03-30 PROCEDURE — 97140 MANUAL THERAPY 1/> REGIONS: CPT

## 2022-03-30 PROCEDURE — 97110 THERAPEUTIC EXERCISES: CPT

## 2022-03-30 NOTE — PROGRESS NOTES
Patient Name: Anival Kramer  Date:3/30/2022  : 1960  [x]  Patient  Verified  Payor: Jameson Hilario / Plan: [address] PPO / Product Type: PPO /    Total Treatment Time (min): 60  1:1 Treatment Time ( [address] only):   Referring provider: Mihai Reddy DO     1. Primary osteoarthritis of left hip  2. Acute postoperative pain of left hip    SUBJECTIVE    Patient reports he has been doing more and more walking without his cane with subsequent soreness. He denies any difficulty sleeping at night. OBJECTIVE  Modality:   []  E-Stim: type _ x _ min     []att   []unatt   []w/US   []w/ice   []w/heat  []  Ultrasound: []cont   []pulse    _ W/cm2 x _  min   []1MHz   []3MHz  []  Ice pack _  Post       [] Hot pack _  Pre       []  Other:    Man: 15 min  Mobilization of the hip into all directions as protocol and tolerance allow. NMR: 15 min  Manual assisted neuromuscular down regulation techniques to the adductors, iliopsoas, and anterior thigh. Gentle incisional mobilizations. Ex: 30 min  Therapeutic exercise/proprioceptive training/neuromuscular reeducation/therapeutic activity completed here in clinic today per the exercise log. PT Exercise Log         EXERCISE 3/30/2022   LAQ 7#   slider y   [employer] y   Cups  (nmr) y   Bridge with Tband green   TKE green   Nu-step Level 2.0  10'   Balance board  (nmr) y   Lat walk green   A-P Gait green   TG press Lv 17                                      Ex: 30 min  Man: 15 min  NMR: 15 min    ASSESSMENT  [x]  See Plan of Care  [x]  Patient will continue to benefit from skilled therapy to address remaining functional deficits:     Gait is becoming less antalgic with decreased dependency on SPC. Fatigue with exercises in clinic but no specific c/o pain. PLAN  Continue with current plan of care and progress as appropriate towards functional goals.   [x]  Upgrade activities as tolerated     [x]  Continue plan of care  [] Discharge due to:_  [] Other:_       Panda Alexandre, PTA  3/30/2022    4:15 PM

## 2022-04-20 DIAGNOSIS — Z96.642 AFTERCARE FOLLOWING LEFT HIP JOINT REPLACEMENT SURGERY: Primary | ICD-10-CM

## 2022-04-20 DIAGNOSIS — Z47.1 AFTERCARE FOLLOWING LEFT HIP JOINT REPLACEMENT SURGERY: Primary | ICD-10-CM

## 2022-06-25 ENCOUNTER — TRANSCRIBE ORDER (OUTPATIENT)
Dept: SCHEDULING | Age: 62
End: 2022-06-25

## 2022-06-25 DIAGNOSIS — Z00.8 OTHER SPECIFIED GENERAL MEDICAL EXAMINATION: ICD-10-CM

## 2022-06-25 DIAGNOSIS — F17.200 SMOKER: ICD-10-CM

## 2022-06-25 DIAGNOSIS — R22.0 LOCALIZED SWELLING, MASS, AND LUMP OF HEAD: Primary | ICD-10-CM

## 2022-06-30 ENCOUNTER — HOSPITAL ENCOUNTER (OUTPATIENT)
Dept: ULTRASOUND IMAGING | Age: 62
Discharge: HOME | End: 2022-06-30
Attending: OTOLARYNGOLOGY
Payer: COMMERCIAL

## 2022-06-30 DIAGNOSIS — Z00.8 OTHER SPECIFIED GENERAL MEDICAL EXAMINATION: ICD-10-CM

## 2022-06-30 DIAGNOSIS — R22.0 LOCALIZED SWELLING, MASS, AND LUMP OF HEAD: ICD-10-CM

## 2022-06-30 DIAGNOSIS — F17.200 SMOKER: ICD-10-CM

## 2022-06-30 PROCEDURE — 88305 TISSUE EXAM BY PATHOLOGIST: CPT

## 2022-06-30 PROCEDURE — 88342 IMHCHEM/IMCYTCHM 1ST ANTB: CPT

## 2022-06-30 PROCEDURE — 10005 FNA BX W/US GDN 1ST LES: CPT

## 2022-06-30 PROCEDURE — 88172 CYTP DX EVAL FNA 1ST EA SITE: CPT

## 2022-06-30 PROCEDURE — 88341 IMHCHEM/IMCYTCHM EA ADD ANTB: CPT

## 2022-06-30 PROCEDURE — 88173 CYTOPATH EVAL FNA REPORT: CPT

## 2022-06-30 PROCEDURE — 74011000250 HC RX REV CODE- 250: Performed by: OTOLARYNGOLOGY

## 2022-06-30 RX ORDER — LIDOCAINE HYDROCHLORIDE 10 MG/ML
10 INJECTION, SOLUTION EPIDURAL; INFILTRATION; INTRACAUDAL; PERINEURAL
Status: COMPLETED | OUTPATIENT
Start: 2022-06-30 | End: 2022-06-30

## 2022-06-30 RX ADMIN — LIDOCAINE HYDROCHLORIDE 10 ML: 10 INJECTION, SOLUTION EPIDURAL; INFILTRATION; INTRACAUDAL; PERINEURAL at 16:00

## 2022-07-11 ENCOUNTER — TRANSCRIBE ORDER (OUTPATIENT)
Dept: SCHEDULING | Age: 62
End: 2022-07-11

## 2022-07-11 DIAGNOSIS — C09.9 MALIGNANT NEOPLASM OF TONSIL (HCC): Primary | ICD-10-CM

## 2022-07-21 ENCOUNTER — HOSPITAL ENCOUNTER (OUTPATIENT)
Dept: PET IMAGING | Age: 62
Discharge: HOME | End: 2022-07-21
Attending: OTOLARYNGOLOGY
Payer: COMMERCIAL

## 2022-07-21 VITALS — HEIGHT: 72 IN | WEIGHT: 200 LBS | BODY MASS INDEX: 27.09 KG/M2

## 2022-07-21 DIAGNOSIS — C09.9 MALIGNANT NEOPLASM OF TONSIL (HCC): ICD-10-CM

## 2022-07-21 LAB
GLUCOSE BLD STRIP.AUTO-MCNC: 121 MG/DL (ref 65–117)
SERVICE CMNT-IMP: ABNORMAL

## 2022-07-21 PROCEDURE — A9552 F18 FDG: HCPCS

## 2022-07-21 RX ORDER — FLUDEOXYGLUCOSE F-18 200 MCI/ML
10 INJECTION INTRAVENOUS ONCE
Status: COMPLETED | OUTPATIENT
Start: 2022-07-21 | End: 2022-07-21

## 2022-07-21 RX ADMIN — FLUDEOXYGLUCOSE F-18 10 MILLICURIE: 200 INJECTION INTRAVENOUS at 07:47

## 2022-08-10 NOTE — PROGRESS NOTES
[address]  Endoscopy Preprocedure Instructions      1. On the day of your surgery, please report to registration located on the 2nd floor of the  [facility]. yes    2. You must have a responsible adult to drive you to the hospital, stay at the hospital during your procedure and drive you home. If they leave your procedure will not be started (no exceptions). yes    3. Do not have anything to eat or drink (including water, gum, mints, coffee, and juice) after midnight. This does not apply to the medications you were instructed to take by your physician. yes  If you are currently taking Plavix, Coumadin, Aspirin, or other blood-thinning agents, contact your physician for special instructions. not applicable,    4. If you are having a procedure that requires bowel prep: We recommend that you have only clear liquids the day before your procedure and begin your bowel prep by 5:00 pm.  You may continue to drink clear liquids until midnight. If for any reason you are not able to complete your prep please notify your physician immediately. not applicable    5. Have a list of all current medications, including vitamins, herbal supplements and any other over the counter medications. yes    6. If you wear glasses, contacts, dentures and/or hearing aids, they may be removed prior to procedure, please bring a case to store them in. yes    7. You should understand that if you do not follow these instructions your procedure may be cancelled. If your physical condition changes (I.e. fever, cold or flu) please contact your doctor as soon as possible. 8. It is important that you be on time. If for any reason you are unable to keep your appointment please call [phone] the day of or your physicians office prior to your scheduled procedure    9.  Have you received your COVID Vaccine? not applicable If no, you will need to receive a COVID test/swab here at [facility] the MOB parking lot Monday - Friday 8a - 11am. There are no Saturday or Sunday swabbing at any [address] [place] [address] facility. (patient verbalizes understanding) not applicable

## 2022-08-11 ENCOUNTER — ANESTHESIA (OUTPATIENT)
Dept: ENDOSCOPY | Age: 62
End: 2022-08-11
Payer: COMMERCIAL

## 2022-08-11 ENCOUNTER — HOSPITAL ENCOUNTER (OUTPATIENT)
Age: 62
Setting detail: OUTPATIENT SURGERY
Discharge: HOME | End: 2022-08-11
Attending: INTERNAL MEDICINE | Admitting: INTERNAL MEDICINE
Payer: COMMERCIAL

## 2022-08-11 ENCOUNTER — ANESTHESIA EVENT (OUTPATIENT)
Dept: ENDOSCOPY | Age: 62
End: 2022-08-11
Payer: COMMERCIAL

## 2022-08-11 VITALS
TEMPERATURE: 97.9 F | OXYGEN SATURATION: 94 % | WEIGHT: 197.97 LBS | SYSTOLIC BLOOD PRESSURE: 114 MMHG | DIASTOLIC BLOOD PRESSURE: 76 MMHG | BODY MASS INDEX: 29.32 KG/M2 | HEART RATE: 76 BPM | HEIGHT: 69 IN | RESPIRATION RATE: 16 BRPM

## 2022-08-11 PROCEDURE — 74011000250 HC RX REV CODE- 250: Performed by: INTERNAL MEDICINE

## 2022-08-11 PROCEDURE — 2709999900 HC NON-CHARGEABLE SUPPLY

## 2022-08-11 PROCEDURE — 74011250636 HC RX REV CODE- 250/636: Performed by: ANESTHESIOLOGY

## 2022-08-11 PROCEDURE — 74011250636 HC RX REV CODE- 250/636: Performed by: INTERNAL MEDICINE

## 2022-08-11 PROCEDURE — 74011250637 HC RX REV CODE- 250/637: Performed by: NURSE ANESTHETIST, CERTIFIED REGISTERED

## 2022-08-11 PROCEDURE — 74011000250 HC RX REV CODE- 250: Performed by: ANESTHESIOLOGY

## 2022-08-11 PROCEDURE — 2709999900 HC NON-CHARGEABLE SUPPLY: Performed by: INTERNAL MEDICINE

## 2022-08-11 PROCEDURE — 76060000032 HC ANESTHESIA 0.5 TO 1 HR: Performed by: INTERNAL MEDICINE

## 2022-08-11 PROCEDURE — 74011250636 HC RX REV CODE- 250/636: Performed by: NURSE ANESTHETIST, CERTIFIED REGISTERED

## 2022-08-11 PROCEDURE — 77030005122 HC CATH GASTMY PEG BSC -B: Performed by: INTERNAL MEDICINE

## 2022-08-11 PROCEDURE — 76040000007: Performed by: INTERNAL MEDICINE

## 2022-08-11 RX ORDER — SODIUM CHLORIDE 9 MG/ML
INJECTION, SOLUTION INTRAVENOUS
Status: DISCONTINUED | OUTPATIENT
Start: 2022-08-11 | End: 2022-08-11 | Stop reason: HOSPADM

## 2022-08-11 RX ORDER — ATROPINE SULFATE 0.1 MG/ML
0.4 INJECTION INTRAVENOUS
Status: DISCONTINUED | OUTPATIENT
Start: 2022-08-11 | End: 2022-08-11 | Stop reason: HOSPADM

## 2022-08-11 RX ORDER — FLUMAZENIL 0.1 MG/ML
0.2 INJECTION INTRAVENOUS
Status: DISCONTINUED | OUTPATIENT
Start: 2022-08-11 | End: 2022-08-11 | Stop reason: HOSPADM

## 2022-08-11 RX ORDER — LIDOCAINE HYDROCHLORIDE 20 MG/ML
INJECTION, SOLUTION EPIDURAL; INFILTRATION; INTRACAUDAL; PERINEURAL AS NEEDED
Status: DISCONTINUED | OUTPATIENT
Start: 2022-08-11 | End: 2022-08-11 | Stop reason: HOSPADM

## 2022-08-11 RX ORDER — EPINEPHRINE 0.1 MG/ML
1 INJECTION INTRACARDIAC; INTRAVENOUS
Status: DISCONTINUED | OUTPATIENT
Start: 2022-08-11 | End: 2022-08-11 | Stop reason: HOSPADM

## 2022-08-11 RX ORDER — DEXTROMETHORPHAN/PSEUDOEPHED 2.5-7.5/.8
1.2 DROPS ORAL
Status: DISCONTINUED | OUTPATIENT
Start: 2022-08-11 | End: 2022-08-11 | Stop reason: HOSPADM

## 2022-08-11 RX ORDER — NALOXONE HYDROCHLORIDE 0.4 MG/ML
0.4 INJECTION, SOLUTION INTRAMUSCULAR; INTRAVENOUS; SUBCUTANEOUS
Status: DISCONTINUED | OUTPATIENT
Start: 2022-08-11 | End: 2022-08-11 | Stop reason: HOSPADM

## 2022-08-11 RX ORDER — ALBUTEROL SULFATE 90 UG/1
AEROSOL, METERED RESPIRATORY (INHALATION) AS NEEDED
Status: DISCONTINUED | OUTPATIENT
Start: 2022-08-11 | End: 2022-08-11 | Stop reason: HOSPADM

## 2022-08-11 RX ORDER — SODIUM CHLORIDE 9 MG/ML
50 INJECTION, SOLUTION INTRAVENOUS CONTINUOUS
Status: DISCONTINUED | OUTPATIENT
Start: 2022-08-11 | End: 2022-08-11 | Stop reason: HOSPADM

## 2022-08-11 RX ORDER — PROPOFOL 10 MG/ML
INJECTION, EMULSION INTRAVENOUS
Status: DISCONTINUED | OUTPATIENT
Start: 2022-08-11 | End: 2022-08-11 | Stop reason: HOSPADM

## 2022-08-11 RX ORDER — MIDAZOLAM HYDROCHLORIDE 1 MG/ML
.25-5 INJECTION, SOLUTION INTRAMUSCULAR; INTRAVENOUS
Status: DISCONTINUED | OUTPATIENT
Start: 2022-08-11 | End: 2022-08-11 | Stop reason: HOSPADM

## 2022-08-11 RX ADMIN — PROPOFOL 100 MG: 10 INJECTION, EMULSION INTRAVENOUS at 13:29

## 2022-08-11 RX ADMIN — SODIUM CHLORIDE: 9 INJECTION, SOLUTION INTRAVENOUS at 13:11

## 2022-08-11 RX ADMIN — LIDOCAINE HYDROCHLORIDE 100 MG: 20 INJECTION, SOLUTION EPIDURAL; INFILTRATION; INTRACAUDAL; PERINEURAL at 13:17

## 2022-08-11 RX ADMIN — PROPOFOL 200 MCG/KG/MIN: 10 INJECTION, EMULSION INTRAVENOUS at 13:16

## 2022-08-11 RX ADMIN — PROPOFOL 100 MG: 10 INJECTION, EMULSION INTRAVENOUS at 13:19

## 2022-08-11 RX ADMIN — ALBUTEROL SULFATE 3 PUFF: 90 AEROSOL, METERED RESPIRATORY (INHALATION) at 13:47

## 2022-08-11 NOTE — PROGRESS NOTES
[facility]  1960  [id number]    Situation:  Verbal report received from: Alysha Wallace RN  Procedure: Procedure(s):  ESOPHAGOGASTRODUODENOSCOPY (EGD) WITH PEG PLACEMENT  PERCUTANEOUS ENDOSCOPIC GASTROSTOMY TUBE INSERTION    Background:    Preoperative diagnosis: TONSIL AND NECK CANCER  Postoperative diagnosis: peg tube placement    :  Dr. Sergey Edouard  Assistant(s): Endoscopy Technician-1: Christos Gonzales  Endoscopy RN-1: Stephie Smith RN    Specimens: * No specimens in log *  H. Pylori  no    Assessment:  Intra-procedure medications     Anesthesia gave intra-procedure sedation and medications, see anesthesia flow sheet yes    Intravenous fluids: NS@ KVO     Vital signs stable yes    Abdominal assessment: round and soft yes    Recommendation:  Discharge patient per MD order yes.   Return to floor na  Family or Friend family  Permission to share finding with family or friend yes

## 2022-08-11 NOTE — PROCEDURES
Jordana Tran M.D.  [phone]            PEG ENDOSCOPY    NAME: Yue Zimmerman   :  1960   MRN:  [id number]       Procedure Type:   EGD and PEG tube     Indications: Tonsillar cancer      Pre-operative Diagnosis: see indication above    Post-operative Diagnosis:  See findings below    :  Shoshana Runner, MD    Referring Provider: Ray Serna MD    Sedation:  MAC anesthesia Propofol     Findings:     Esophagus: normal  Stomach: normal     On slow withdrawal of the scope, the stomach was transilluminated into the abdominal wall. Under sterile conditions and 1% Xylocaine anesthesia, a small incision was made in the abdominal wall. A needle was passed through the incision, and under direct vision into the stomach. A wire was passed through the needle, snared and brought out the mouth. The PEG tube was passed over the wire and brought out the abdominal wall without difficulty. The scope was then reinserted and the positioning of the PEG tube was excellent. Duodenum/jejunum: normal    Interventions:    20Fr Peg tube placed    Specimens:   * No specimens in log *     EBL: None          Complications:     No immediate complications      Impression:     Successful peg tube placed - external bumper at 7cms     Recommendations: --the tube should be flushed with 30 mL of water every 8 hours and after giving medications through the tube. Monitor the PEG site for bleeding and infection.   Follow up with oncology   Peg tube can be removed after completion of therapy        Signed By: Shoshana Runner, MD     2022  1:41 PM

## 2022-08-11 NOTE — H&P
Helena Sotomayor M.D.  [phone]            History and Physical       NAME:  Tori Go   :   1960   MRN:   [id number]       Referring Physician:  Taiwo Ashraf MD      Consult Date: 2022 11:54 AM    Chief Complaint:  Tonsillar cancer    History of Present Illness:  Patient at [facility]. Diagnosed with tonsillar cancer. Referred to us to discuss PEG. Plans are for chemoradiation. Today, he has no complaints. He states he begins chemo and radiation on . Oncologist is Dr. Bhavani Campbell. Has appointment with nutritionist later today. He wants to proceed with PEG placement. PMH:  Past Medical History:   Diagnosis Date    Cancer ([address]     Tonsil and neck    Chronic pain     Kidney stone     Psychiatric disorder     anxiety    Sleep apnea     no longer uses CPAP       PSH:  Past Surgical History:   Procedure Laterality Date    HX HEENT      LASIK    HX HIP REPLACEMENT      HX KNEE ARTHROSCOPY Right     HX VASECTOMY         Allergies:  No Known Allergies    Home Medications:  Prior to Admission Medications   Prescriptions Last Dose Informant Patient Reported? Taking?   escitalopram oxalate (LEXAPRO) 10 mg tablet 2022 Self Yes Yes   Sig: Take 10 mg by mouth daily. Facility-Administered Medications: None       Hospital Medications:  No current facility-administered medications for this encounter. Social History:  Social History     Tobacco Use    Smoking status: Every Day     Packs/day: 0.50     Types: Cigarettes    Smokeless tobacco: Never   Substance Use Topics    Alcohol use: Not Currently     Comment: 12 beers per week       Family History:  History reviewed. No pertinent family history.           Review of Systems:      Constitutional: negative fever, negative chills, negative weight loss  Eyes:   negative visual changes  ENT:   negative sore throat, tongue or lip swelling  Respiratory:  negative cough, negative dyspnea  Cards:  negative for chest pain, palpitations, lower extremity edema  GI:   See HPI  :  negative for frequency, dysuria  Integument:  negative for rash and pruritus  Heme:  negative for easy bruising and gum/nose bleeding  Musculoskel: negative for myalgias,  back pain and muscle weakness  Neuro: negative for headaches, dizziness, vertigo  Psych:  negative for feelings of anxiety, depression       Objective:   Patient Vitals for the past 8 hrs:   BP Temp Pulse Resp SpO2 Height Weight   08/11/22 1127 (!) 170/97 98.5 °F (36.9 °C) 74 18 97 % -- --   08/11/22 1121 -- -- -- -- -- 5' 9\" (1.753 m) 89.8 kg (197 lb 15.6 oz)     No intake/output data recorded. No intake/output data recorded. EXAM:     NEURO-a&o   HEENT-wnl   LUNGS-clear    COR-regular rate and rhythym     ABD-soft , no tenderness, no rebound, good bs     EXT-no edema     Data Review     No results for input(s): WBC, HGB, HCT, PLT, HGBEXT, HCTEXT, PLTEXT in the last 72 hours. No results for input(s): NA, K, CL, CO2, BUN, CREA, GLU, PHOS, CA in the last 72 hours. No results for input(s): AP, TBIL, TP, ALB, GLOB, GGT, AML, LPSE in the last 72 hours. No lab exists for component: SGOT, GPT, AMYP, HLPSE  No results for input(s): INR, PTP, APTT, INREXT in the last 72 hours. Patient Active Problem List   Diagnosis Code    Unilateral primary osteoarthritis, left hip M16.12      Assessment:     Tonsillar cancer   Plan:     EGD with peg tube placement today.      Signed By: Mason Leong MD     8/11/2022  11:54 AM

## 2022-08-11 NOTE — DISCHARGE INSTRUCTIONS
Bryant Kamara M.D.  [phone]           2022  Mikaela Robertson  :  1960  [address] [place] [address] Medical Record Number:  [id number]        ENDOSCOPY FINDINGS:   Your endoscopy showed peg tube placed. EGD DISCHARGE INSTRUCTIONS    DISCOMFORT:  Sore throat- throat lozenges or warm salt water gargle  redness at IV site- apply warm compress to area; if redness or soreness persist- contact your physician  Gaseous discomfort- walking, belching will help relieve any discomfort  You may not operate a vehicle for 12 hours  You may not engage in an occupation involving machinery or appliances for rest of today  You may not drink alcoholic beverages for at least 12 hours  Avoid making any critical decisions for at least 24 hour    DIET:   You may resume your regular diet. ACTIVITY  Spend the remainder of the day resting -  avoid any strenuous activity. Avoid driving or operating machinery. CALL M.D. ANY SIGN OF   Increasing pain, nausea, vomiting  Abdominal distension (swelling)  New increased bleeding (oral or rectal)  Fever (chills)  Pain in chest area  Bloody discharge from nose or mouth  Shortness of breath    Follow-up Instructions:   Call Dr. Amparo Bobby for any questions or problems. Telephone # [phone]  If biopsies were obtained, the results will be available  in  5 to 7 days. We will call you to notify you of these results. Continue same medications. Follow up in the office.

## 2022-08-11 NOTE — ANESTHESIA PREPROCEDURE EVALUATION
Relevant Problems   No relevant active problems       Anesthetic History   No history of anesthetic complications            Review of Systems / Medical History  Patient summary reviewed and pertinent labs reviewed    Pulmonary        Sleep apnea: No treatment  Smoker (1ppd since 2010)         Neuro/Psych         Psychiatric history (anxiety)     Cardiovascular                  Exercise tolerance: >4 METS  Comments: EKG (03/2022): NSR   GI/Hepatic/Renal         Renal disease: stones       Endo/Other        Cancer     Other Findings   Comments: Tonsillar cancer needing PEG tube- no tx yet; denies choking or trouble breathing    EtOH: 28 drinks/week         Physical Exam    Airway  Mallampati: II  TM Distance: > 6 cm  Neck ROM: normal range of motion   Mouth opening: Normal    Comments: Left , soft, submandibular neck mass Cardiovascular  Regular rate and rhythm,  S1 and S2 normal,  no murmur, click, rub, or gallop  Rhythm: regular  Rate: normal         Dental    Dentition: Poor dentition     Pulmonary  Breath sounds clear to auscultation               Abdominal  GI exam deferred       Other Findings            Anesthetic Plan    ASA: 3  Anesthesia type: MAC          Induction: Intravenous  Anesthetic plan and risks discussed with: Patient

## 2022-08-11 NOTE — PROGRESS NOTES
Endoscopy discharge instructions have been reviewed and given to patient. The patient verbalized understanding and acceptance of instructions. Dr. Owusu Reasons discussed with patient procedure findings and next steps.

## 2022-08-11 NOTE — ANESTHESIA POSTPROCEDURE EVALUATION
Procedure(s):  ESOPHAGOGASTRODUODENOSCOPY (EGD) WITH PEG PLACEMENT  PERCUTANEOUS ENDOSCOPIC GASTROSTOMY TUBE INSERTION. MAC    Anesthesia Post Evaluation      Multimodal analgesia: multimodal analgesia not used between 6 hours prior to anesthesia start to PACU discharge  Patient location during evaluation: PACU  Patient participation: complete - patient participated  Level of consciousness: awake  Pain management: adequate  Airway patency: patent  Anesthetic complications: no  Cardiovascular status: acceptable, blood pressure returned to baseline and hemodynamically stable  Respiratory status: acceptable  Hydration status: acceptable  Post anesthesia nausea and vomiting:  controlled  Final Post Anesthesia Temperature Assessment:  Normothermia (36.0-37.5 degrees C)      INITIAL Post-op Vital signs:   Vitals Value Taken Time   /76 08/11/22 1412   Temp 36.6 °C (97.9 °F) 08/11/22 1359   Pulse 76 08/11/22 1414   Resp 20 08/11/22 1414   SpO2 94 % 08/11/22 1414   Vitals shown include unvalidated device data.

## 2022-08-11 NOTE — ADDENDUM NOTE
Addendum  created 08/11/22 1512 by Anika Silva MD    Flowsheet accepted, Intraprocedure Event edited, Intraprocedure Meds edited

## 2022-08-12 ENCOUNTER — HOSPITAL ENCOUNTER (EMERGENCY)
Age: 62
Discharge: HOME | End: 2022-08-12
Attending: STUDENT IN AN ORGANIZED HEALTH CARE EDUCATION/TRAINING PROGRAM
Payer: COMMERCIAL

## 2022-08-12 ENCOUNTER — APPOINTMENT (OUTPATIENT)
Dept: CT IMAGING | Age: 62
End: 2022-08-12
Attending: STUDENT IN AN ORGANIZED HEALTH CARE EDUCATION/TRAINING PROGRAM
Payer: COMMERCIAL

## 2022-08-12 VITALS
WEIGHT: 199.08 LBS | OXYGEN SATURATION: 100 % | RESPIRATION RATE: 16 BRPM | SYSTOLIC BLOOD PRESSURE: 166 MMHG | BODY MASS INDEX: 29.49 KG/M2 | HEART RATE: 77 BPM | TEMPERATURE: 97.9 F | DIASTOLIC BLOOD PRESSURE: 91 MMHG | HEIGHT: 69 IN

## 2022-08-12 DIAGNOSIS — G89.18 POST-OP PAIN: Primary | ICD-10-CM

## 2022-08-12 DIAGNOSIS — Z93.1 GASTROINTESTINAL TUBE PRESENT (HCC): ICD-10-CM

## 2022-08-12 LAB
ALBUMIN SERPL-MCNC: 3.7 G/DL (ref 3.5–5)
ALBUMIN/GLOB SERPL: 1 {RATIO} (ref 1.1–2.2)
ALP SERPL-CCNC: 88 U/L (ref 45–117)
ALT SERPL-CCNC: 38 U/L (ref 12–78)
ANION GAP SERPL CALC-SCNC: 8 MMOL/L (ref 5–15)
AST SERPL-CCNC: 21 U/L (ref 15–37)
BASOPHILS # BLD: 0.1 K/UL (ref 0–0.1)
BASOPHILS NFR BLD: 0 % (ref 0–1)
BILIRUB SERPL-MCNC: 0.7 MG/DL (ref 0.2–1)
BUN SERPL-MCNC: 19 MG/DL (ref 6–20)
BUN/CREAT SERPL: 23 (ref 12–20)
CALCIUM SERPL-MCNC: 9.3 MG/DL (ref 8.5–10.1)
CHLORIDE SERPL-SCNC: 104 MMOL/L (ref 97–108)
CO2 SERPL-SCNC: 28 MMOL/L (ref 21–32)
CREAT SERPL-MCNC: 0.82 MG/DL (ref 0.7–1.3)
DIFFERENTIAL METHOD BLD: ABNORMAL
EOSINOPHIL # BLD: 0.1 K/UL (ref 0–0.4)
EOSINOPHIL NFR BLD: 1 % (ref 0–7)
ERYTHROCYTE [DISTWIDTH] IN BLOOD BY AUTOMATED COUNT: 12.9 % (ref 11.5–14.5)
GLOBULIN SER CALC-MCNC: 3.8 G/DL (ref 2–4)
GLUCOSE SERPL-MCNC: 98 MG/DL (ref 65–100)
HCT VFR BLD AUTO: 43.7 % (ref 36.6–50.3)
HGB BLD-MCNC: 14.3 G/DL (ref 12.1–17)
IMM GRANULOCYTES # BLD AUTO: 0.1 K/UL (ref 0–0.04)
IMM GRANULOCYTES NFR BLD AUTO: 0 % (ref 0–0.5)
LIPASE SERPL-CCNC: 111 U/L (ref 73–393)
LYMPHOCYTES # BLD: 3.3 K/UL (ref 0.8–3.5)
LYMPHOCYTES NFR BLD: 24 % (ref 12–49)
MCH RBC QN AUTO: 31.4 PG (ref 26–34)
MCHC RBC AUTO-ENTMCNC: 32.7 G/DL (ref 30–36.5)
MCV RBC AUTO: 95.8 FL (ref 80–99)
MONOCYTES # BLD: 1 K/UL (ref 0–1)
MONOCYTES NFR BLD: 7 % (ref 5–13)
NEUTS SEG # BLD: 9.4 K/UL (ref 1.8–8)
NEUTS SEG NFR BLD: 68 % (ref 32–75)
NRBC # BLD: 0 K/UL (ref 0–0.01)
NRBC BLD-RTO: 0 PER 100 WBC
PLATELET # BLD AUTO: 273 K/UL (ref 150–400)
PMV BLD AUTO: 8.1 FL (ref 8.9–12.9)
POTASSIUM SERPL-SCNC: 4.3 MMOL/L (ref 3.5–5.1)
PROT SERPL-MCNC: 7.5 G/DL (ref 6.4–8.2)
RBC # BLD AUTO: 4.56 M/UL (ref 4.1–5.7)
SODIUM SERPL-SCNC: 140 MMOL/L (ref 136–145)
TROPONIN-HIGH SENSITIVITY: 5 NG/L (ref 0–76)
WBC # BLD AUTO: 13.8 K/UL (ref 4.1–11.1)

## 2022-08-12 PROCEDURE — 93005 ELECTROCARDIOGRAM TRACING: CPT

## 2022-08-12 PROCEDURE — 80053 COMPREHEN METABOLIC PANEL: CPT

## 2022-08-12 PROCEDURE — 83690 ASSAY OF LIPASE: CPT

## 2022-08-12 PROCEDURE — 84484 ASSAY OF TROPONIN QUANT: CPT

## 2022-08-12 PROCEDURE — 74011000636 HC RX REV CODE- 636: Performed by: STUDENT IN AN ORGANIZED HEALTH CARE EDUCATION/TRAINING PROGRAM

## 2022-08-12 PROCEDURE — 74177 CT ABD & PELVIS W/CONTRAST: CPT

## 2022-08-12 PROCEDURE — 85025 COMPLETE CBC W/AUTO DIFF WBC: CPT

## 2022-08-12 PROCEDURE — 36415 COLL VENOUS BLD VENIPUNCTURE: CPT

## 2022-08-12 PROCEDURE — 99285 EMERGENCY DEPT VISIT HI MDM: CPT

## 2022-08-12 RX ADMIN — IOPAMIDOL 100 ML: 755 INJECTION, SOLUTION INTRAVENOUS at 14:42

## 2022-08-12 NOTE — DISCHARGE INSTRUCTIONS
He is continue to monitor symptoms at home and return to ER immediately if you have worsening pain, fevers, chills or other concerning symptoms.

## 2022-08-12 NOTE — ED PROVIDER NOTES
EMERGENCY DEPARTMENT HISTORY AND PHYSICAL EXAM      Date: 8/12/2022  Patient Name: Patrice Barcenas    History of Presenting Illness     Chief Complaint   Patient presents with    Abdominal Pain     Pt ambulatory into triage with a cc of abdominal pain around peg tube secondary to a peg tube placement x 1 day; pt had peg tube placed yesterday and MD Shweta Hernandes advised pt to come to ED for CT scan and evaluation       History Provided By: Patient    Patrice Barcenas, 62 y.o. male     Patient is a 42-year-old male with history of he diagnosed neck cancer presenting with concerns of postoperative pain after having a feeding tube placed yesterday. Patient states that he had uncomplicated course, was sent home with instructions to take Tylenol, started having acute once abdominal pain last night which was 10 out of 10 in the area of his G-tube, in his left upper quadrant but also radiating to his left shoulder, patient states that he got some relief but then pain came back, called Dr Shweta Hernandes performed procedure notify him of pain and recommended CT abdomen for further evaluation. He states that since morning his pain has subsided, currently is comfortable, states that he does have mild tenderness around the area, pain with significant movements but otherwise has been eating and tolerating well, has had no fevers, chills, nausea, vomiting, movement since the procedure, no chest pain or shortness of breath, no cardiac history, denies any other complaints at this time.       There are no other complaints, changes, or physical findings at this time.     PCP: Nam Stewart MD    Current Facility-Administered Medications on File Prior to Encounter   Medication Dose Route Frequency Provider Last Rate Last Admin    [DISCONTINUED] 0.9% sodium chloride infusion  50 mL/hr IntraVENous CONTINUOUS Chantell Glaser MD   IV Completed at 08/11/22 1414    [DISCONTINUED] midazolam (VERSED) injection 0.25-5 mg  0.25-5 mg IntraVENous Freddy Car MD        [DISCONTINUED] naloxone [facility]) injection 0.4 mg  0.4 mg IntraVENous Freddy Car MD        [DISCONTINUED] flumazeniL (ROMAZICON) 0.1 mg/mL injection 0.2 mg  0.2 mg IntraVENous Freddy Car MD        [DISCONTINUED] simethicone (MYLICON) 49YM/7.1FX oral drops 80 mg  1.2 mL Oral Freddy Car MD        [DISCONTINUED] atropine injection 0.4 mg  0.4 mg IntraVENous ONCE PRN Leroy Car MD        [DISCONTINUED] EPINEPHrine (ADRENALIN) 0.1 mg/mL syringe 1 mg  1 mg Endoscopically ONCE PRN Leroy Car MD         Current Outpatient Medications on File Prior to Encounter   Medication Sig Dispense Refill    escitalopram oxalate (LEXAPRO) 10 mg tablet Take 10 mg by mouth daily. Past History     Past Medical History:  Past Medical History:   Diagnosis Date    Cancer ([facility] [address]     Tonsil and neck    Chronic pain     Kidney stone     Psychiatric disorder     anxiety    Sleep apnea     no longer uses CPAP       Past Surgical History:  Past Surgical History:   Procedure Laterality Date    HX HEENT      LASIK    HX HIP REPLACEMENT      HX KNEE ARTHROSCOPY Right     HX VASECTOMY         Family History:  No family history on file. Social History:  Social History     Tobacco Use    Smoking status: Every Day     Packs/day: 0.50     Types: Cigarettes    Smokeless tobacco: Never   Vaping Use    Vaping Use: Never used   Substance Use Topics    Alcohol use: Not Currently     Comment: 12 beers per week    Drug use: No       Allergies:  No Known Allergies      Review of Systems   Review of Systems   Constitutional:  Negative for activity change, chills, fatigue and fever. HENT:  Negative for congestion and sneezing. Respiratory:  Negative for cough and shortness of breath. Cardiovascular:  Negative for chest pain and leg swelling. Gastrointestinal:  Positive for abdominal pain. Negative for constipation, diarrhea, nausea and vomiting.    Genitourinary:  Negative for decreased urine volume, dysuria and urgency. Musculoskeletal:  Negative for arthralgias and myalgias. L shoulder pain   Skin:  Negative for color change, rash and wound. Allergic/Immunologic: Negative for immunocompromised state. Neurological:  Negative for headaches. Hematological:  Does not bruise/bleed easily. Psychiatric/Behavioral:  Negative for confusion. Physical Exam   Physical Exam  Vitals reviewed. Constitutional:       General: He is not in acute distress. Appearance: He is not ill-appearing, toxic-appearing or diaphoretic. HENT:      Head: Normocephalic and atraumatic. Mouth/Throat:      Mouth: Mucous membranes are moist.   Cardiovascular:      Rate and Rhythm: Normal rate. Pulmonary:      Effort: Pulmonary effort is normal. No tachypnea, accessory muscle usage or respiratory distress. Abdominal:      General: Abdomen is flat. Palpations: Abdomen is soft. Tenderness: There is no abdominal tenderness. There is no guarding or rebound. Comments: Without significant tenderness to palpation on exam, does have mild for with palpation near G-tube site, without any erythema, no fluctuance felt, gastric contents in tube,   Musculoskeletal:      Cervical back: Neck supple. Right lower leg: No edema. Left lower leg: No edema. Skin:     General: Skin is warm and dry. Neurological:      General: No focal deficit present. Mental Status: He is alert.    Psychiatric:         Mood and Affect: Mood normal.       Diagnostic Study Results     Labs -     Recent Results (from the past 24 hour(s))   EKG, 12 LEAD, INITIAL    Collection Time: 08/12/22 12:54 PM   Result Value Ref Range    Ventricular Rate 76 BPM    Atrial Rate 76 BPM    P-R Interval 128 ms    QRS Duration 92 ms    Q-T Interval 400 ms    QTC Calculation (Bezet) 450 ms    Calculated P Axis 0 degrees    Calculated R Axis 46 degrees    Calculated T Axis 47 degrees    Diagnosis       Normal sinus rhythm  Normal ECG  When compared with ECG of 01-MAR-2022 08:50,  No significant change was found     CBC WITH AUTOMATED DIFF    Collection Time: 08/12/22 12:59 PM   Result Value Ref Range    WBC 13.8 (H) 4.1 - 11.1 K/uL    RBC 4.56 4.10 - 5.70 M/uL    HGB 14.3 12.1 - 17.0 g/dL    HCT 43.7 36.6 - 50.3 %    MCV 95.8 80.0 - 99.0 FL    MCH 31.4 26.0 - 34.0 PG    MCHC 32.7 30.0 - 36.5 g/dL    RDW 12.9 11.5 - 14.5 %    PLATELET 418 498 - 495 K/uL    MPV 8.1 (L) 8.9 - 12.9 FL    NRBC 0.0 0  WBC    ABSOLUTE NRBC 0.00 0.00 - 0.01 K/uL    NEUTROPHILS 68 32 - 75 %    LYMPHOCYTES 24 12 - 49 %    MONOCYTES 7 5 - 13 %    EOSINOPHILS 1 0 - 7 %    BASOPHILS 0 0 - 1 %    IMMATURE GRANULOCYTES 0 0.0 - 0.5 %    ABS. NEUTROPHILS 9.4 (H) 1.8 - 8.0 K/UL    ABS. LYMPHOCYTES 3.3 0.8 - 3.5 K/UL    ABS. MONOCYTES 1.0 0.0 - 1.0 K/UL    ABS. EOSINOPHILS 0.1 0.0 - 0.4 K/UL    ABS. BASOPHILS 0.1 0.0 - 0.1 K/UL    ABS. IMM. GRANS. 0.1 (H) 0.00 - 0.04 K/UL    DF AUTOMATED     LIPASE    Collection Time: 08/12/22 12:59 PM   Result Value Ref Range    Lipase 111 73 - 212 U/L   METABOLIC PANEL, COMPREHENSIVE    Collection Time: 08/12/22 12:59 PM   Result Value Ref Range    Sodium 140 136 - 145 mmol/L    Potassium 4.3 3.5 - 5.1 mmol/L    Chloride 104 97 - 108 mmol/L    CO2 28 21 - 32 mmol/L    Anion gap 8 5 - 15 mmol/L    Glucose 98 65 - 100 mg/dL    BUN 19 6 - 20 MG/DL    Creatinine 0.82 0.70 - 1.30 MG/DL    BUN/Creatinine ratio 23 (H) 12 - 20      GFR est AA >60 >60 ml/min/1.73m2    GFR est non-AA >60 >60 ml/min/1.73m2    Calcium 9.3 8.5 - 10.1 MG/DL    Bilirubin, total 0.7 0.2 - 1.0 MG/DL    ALT (SGPT) 38 12 - 78 U/L    AST (SGOT) 21 15 - 37 U/L    Alk.  phosphatase 88 45 - 117 U/L    Protein, total 7.5 6.4 - 8.2 g/dL    Albumin 3.7 3.5 - 5.0 g/dL    Globulin 3.8 2.0 - 4.0 g/dL    A-G Ratio 1.0 (L) 1.1 - 2.2     TROPONIN-HIGH SENSITIVITY    Collection Time: 08/12/22 12:59 PM   Result Value Ref Range    Troponin-High Sensitivity 5 0 - 76 ng/L       Radiologic Studies -   CT ABD PELV W CONT   Final Result   Pneumoperitoneum with gastrostomy tube in expected position. Incidentals as above. CT Results  (Last 48 hours)                 08/12/22 1442  CT ABD PELV W CONT Final result    Impression:  Pneumoperitoneum with gastrostomy tube in expected position. Incidentals as above. Narrative:  EXAM: CT ABD PELV W CONT       INDICATION: Pain after feeding tube placement yesterday       COMPARISON: PET/CT 7/21/2022. CT 7/31/2017. CT 5/31/2013. CONTRAST: 100 mL of Isovue-370. TECHNIQUE:    Following the uneventful intravenous administration of contrast, thin axial   images were obtained through the abdomen and pelvis. Coronal and sagittal   reconstructions were generated. Oral contrast was not administered. CT dose   reduction was achieved through use of a standardized protocol tailored for this   examination and automatic exposure control for dose modulation. FINDINGS:    LOWER THORAX: No significant abnormality in the incidentally imaged lower chest.   LIVER: No mass. BILIARY TREE: Gallbladder is unremarkable. CBD is not dilated. SPLEEN: Unremarkable. PANCREAS: No mass or ductal dilatation. ADRENALS: Unremarkable. KIDNEYS: No mass, calculus, or hydronephrosis. STOMACH: Gastrostomy tube in position with a balloon in the gastric lumen at the   body. Otherwise unremarkable. SMALL BOWEL: No dilatation or wall thickening. COLON: No dilatation or wall thickening. Noninflamed appearing left and sigmoid   colon diverticula. APPENDIX: Unremarkable. PERITONEUM: There is pneumoperitoneum most prominent anteriorly around the   gastrostomy tube site. No ascites.    RETROPERITONEUM: There is a 3.0 x 5.3 cm low-density well-circumscribed ovoid   structure interposed between the right lobe of liver and the abdominal wall in   the pararenal fat which was not PET avid on prior exam, is not significantly   changed compared to priors and unlikely to be clinically significant. No   lymphadenopathy or aortic aneurysm. REPRODUCTIVE ORGANS: Prostate and seminal vesicles are unremarkable though   partially obscured by streak artifact. URINARY BLADDER: Partially obscured by streak artifact. No evident calculus or   mass. BONES: No destructive bone lesion. Left hip arthroplasty prosthesis with streak   artifact obscuring adjacent tissues. ABDOMINAL WALL: No mass or hernia. ADDITIONAL COMMENTS: N/A                 CXR Results  (Last 48 hours)      None              Medical Decision Making   I am the first provider for this patient. I reviewed the vital signs, available nursing notes, past medical history, past surgical history, family history and social history. Vital Signs-Reviewed the patient's vital signs. Patient Vitals for the past 12 hrs:   Temp Pulse Resp BP SpO2   08/12/22 1119 97.9 °F (36.6 °C) 77 16 (!) 166/91 100 %       Records Reviewed: Nursing records and medical records reviewed    Ddx: abdominal pain, ACS, postoperative pain    Initial assessment performed. The patients presenting problems have been discussed, and they are in agreement with the care plan formulated and outlined with them. I have encouraged them to ask questions as they arise throughout their visit. MDM  Number of Diagnoses or Management Options  Gastrointestinal tube present ([facility] [address]  Post-op pain  Diagnosis management comments: Is a 62-year-old male presenting with postoperative pain your G-tube site after procedure yesterday, had severe pain overnight x2, became concerned because radiated to left shoulder, patient has not had any more pain for a few hours, states that he took Tylenol with mild relief.   Patient presents stable, no apparent distress, abdomen is soft, nontender, does have mild irritation near the G-tube site but no erythema, fluctuance felt, no significant drainage, gastric contents in the tube itself.,  No rebound or guarding on exam, low suspicion for intra-abdominal pathology but given recent surgery with concern of perforated viscus abdominal pain we will plan on CT with contrast to evaluate further, will also obtain basic lab work including ACS and left shoulder pain and risk factors. Will obtain labs and imaging, reassess pain and discuss results with patient        ED Course as of 08/12/22 1604   Fri Aug 12, 2022   1539 With mild leukocytosis, suspect likely secondary to procedure yesterday, no fever on exam, does haveritoneum around site of G-tube, suspicious secondary to procedure yesterday well, with no abdominal tenderness, rebound or guarding, low suspicion for perforated viscus, given these findings I notified the patient for strict return precautions, also notified him to reach out to his GI team for close follow-up, patient states he will do so and will continue monitor at home, has had no more recurrent abdominal pain since ED. Notified patient of finding of 3.0 x 5.3 cm low-density well-circumscribed ovoid and instructed to follow up with PCP [RN]      ED Course User Index  [RN] Chao Montgomery MD           Procedures        Disposition: Discharge Note:  3:28 PM  The patient has been re-evaluated and is ready for discharge. Reviewed available results with patient. Counseled patient on diagnosis and care plan. Patient has expressed understanding, and all questions have been answered. Patient agrees with plan and agrees to follow up as recommended, or to return to the ED if their symptoms worsen. Discharge instructions have been provided and explained to the patient, along with reasons to return to the ED. DISCHARGE PLAN:  1. Current Discharge Medication List        2.    Follow-up Information       Follow up With Specialties Details Why Contact Info    Binta Horowitz MD [facility] Medicine Schedule an appointment as soon as possible for a visit in 3 days  [facility] [id number]  [phone] [facility] EMERGENCY DEPT Emergency Medicine  If symptoms worsen [address]  [address] [place] [facility]  [phone]          3. Return to ED if worse     Diagnosis     Clinical Impression:   1. Post-op pain    2. Gastrointestinal tube present [facility])        Attestations:    Justen Lomax MD    Please note that this dictation was completed with [employer], the computer voice recognition software. Quite often unanticipated grammatical, syntax, homophones, and other interpretive errors are inadvertently transcribed by the computer software. Please disregard these errors. Please excuse any errors that have escaped final proofreading. Thank you.

## 2022-08-13 LAB
ATRIAL RATE: 76 BPM
CALCULATED P AXIS, ECG09: 0 DEGREES
CALCULATED R AXIS, ECG10: 46 DEGREES
CALCULATED T AXIS, ECG11: 47 DEGREES
DIAGNOSIS, 93000: NORMAL
P-R INTERVAL, ECG05: 128 MS
Q-T INTERVAL, ECG07: 400 MS
QRS DURATION, ECG06: 92 MS
QTC CALCULATION (BEZET), ECG08: 450 MS
VENTRICULAR RATE, ECG03: 76 BPM

## 2022-10-02 ENCOUNTER — HOSPITAL ENCOUNTER (EMERGENCY)
Age: 62
Discharge: HOME | End: 2022-10-02
Attending: EMERGENCY MEDICINE
Payer: COMMERCIAL

## 2022-10-02 VITALS
RESPIRATION RATE: 18 BRPM | WEIGHT: 176.37 LBS | BODY MASS INDEX: 26.12 KG/M2 | HEART RATE: 97 BPM | SYSTOLIC BLOOD PRESSURE: 149 MMHG | HEIGHT: 69 IN | DIASTOLIC BLOOD PRESSURE: 92 MMHG | TEMPERATURE: 98.7 F | OXYGEN SATURATION: 99 %

## 2022-10-02 DIAGNOSIS — E86.0 DEHYDRATION: Primary | ICD-10-CM

## 2022-10-02 LAB
ALBUMIN SERPL-MCNC: 3.3 G/DL (ref 3.5–5)
ALBUMIN/GLOB SERPL: 0.8 {RATIO} (ref 1.1–2.2)
ALP SERPL-CCNC: 87 U/L (ref 45–117)
ALT SERPL-CCNC: 264 U/L (ref 12–78)
AMORPH CRY URNS QL MICRO: ABNORMAL
ANION GAP SERPL CALC-SCNC: 8 MMOL/L (ref 5–15)
APPEARANCE UR: CLEAR
AST SERPL-CCNC: 100 U/L (ref 15–37)
BACTERIA URNS QL MICRO: NEGATIVE /HPF
BASOPHILS # BLD: 0 K/UL (ref 0–0.1)
BASOPHILS NFR BLD: 0 % (ref 0–1)
BILIRUB SERPL-MCNC: 0.6 MG/DL (ref 0.2–1)
BILIRUB UR QL: NEGATIVE
BUN SERPL-MCNC: 32 MG/DL (ref 6–20)
BUN/CREAT SERPL: 21 (ref 12–20)
CALCIUM SERPL-MCNC: 9.1 MG/DL (ref 8.5–10.1)
CHLORIDE SERPL-SCNC: 96 MMOL/L (ref 97–108)
CO2 SERPL-SCNC: 29 MMOL/L (ref 21–32)
COLOR UR: ABNORMAL
CREAT SERPL-MCNC: 1.53 MG/DL (ref 0.7–1.3)
DIFFERENTIAL METHOD BLD: ABNORMAL
EOSINOPHIL # BLD: 0 K/UL (ref 0–0.4)
EOSINOPHIL NFR BLD: 0 % (ref 0–7)
EPITH CASTS URNS QL MICRO: ABNORMAL /LPF
ERYTHROCYTE [DISTWIDTH] IN BLOOD BY AUTOMATED COUNT: 14.7 % (ref 11.5–14.5)
GLOBULIN SER CALC-MCNC: 4.3 G/DL (ref 2–4)
GLUCOSE SERPL-MCNC: 153 MG/DL (ref 65–100)
GLUCOSE UR STRIP.AUTO-MCNC: NEGATIVE MG/DL
HCT VFR BLD AUTO: 26.3 % (ref 36.6–50.3)
HGB BLD-MCNC: 9.5 G/DL (ref 12.1–17)
HGB UR QL STRIP: NEGATIVE
IMM GRANULOCYTES # BLD AUTO: 0 K/UL (ref 0–0.04)
IMM GRANULOCYTES NFR BLD AUTO: 1 % (ref 0–0.5)
KETONES UR QL STRIP.AUTO: ABNORMAL MG/DL
LEUKOCYTE ESTERASE UR QL STRIP.AUTO: NEGATIVE
LIPASE SERPL-CCNC: 83 U/L (ref 73–393)
LYMPHOCYTES # BLD: 0.4 K/UL (ref 0.8–3.5)
LYMPHOCYTES NFR BLD: 9 % (ref 12–49)
MCH RBC QN AUTO: 32.6 PG (ref 26–34)
MCHC RBC AUTO-ENTMCNC: 36.1 G/DL (ref 30–36.5)
MCV RBC AUTO: 90.4 FL (ref 80–99)
MONOCYTES # BLD: 0.3 K/UL (ref 0–1)
MONOCYTES NFR BLD: 6 % (ref 5–13)
NEUTS SEG # BLD: 3.5 K/UL (ref 1.8–8)
NEUTS SEG NFR BLD: 84 % (ref 32–75)
NITRITE UR QL STRIP.AUTO: NEGATIVE
NRBC # BLD: 0 K/UL (ref 0–0.01)
NRBC BLD-RTO: 0 PER 100 WBC
PH UR STRIP: 5.5 [PH] (ref 5–8)
PLATELET # BLD AUTO: 202 K/UL (ref 150–400)
PMV BLD AUTO: 7.9 FL (ref 8.9–12.9)
POTASSIUM SERPL-SCNC: 3.2 MMOL/L (ref 3.5–5.1)
PROT SERPL-MCNC: 7.6 G/DL (ref 6.4–8.2)
PROT UR STRIP-MCNC: 30 MG/DL
RBC # BLD AUTO: 2.91 M/UL (ref 4.1–5.7)
RBC #/AREA URNS HPF: ABNORMAL /HPF (ref 0–5)
RBC MORPH BLD: ABNORMAL
SODIUM SERPL-SCNC: 133 MMOL/L (ref 136–145)
SP GR UR REFRACTOMETRY: 1.02
UROBILINOGEN UR QL STRIP.AUTO: 1 EU/DL (ref 0.2–1)
WBC # BLD AUTO: 4.2 K/UL (ref 4.1–11.1)
WBC URNS QL MICRO: ABNORMAL /HPF (ref 0–4)

## 2022-10-02 PROCEDURE — 96374 THER/PROPH/DIAG INJ IV PUSH: CPT

## 2022-10-02 PROCEDURE — 80053 COMPREHEN METABOLIC PANEL: CPT

## 2022-10-02 PROCEDURE — 96361 HYDRATE IV INFUSION ADD-ON: CPT

## 2022-10-02 PROCEDURE — 99284 EMERGENCY DEPT VISIT MOD MDM: CPT

## 2022-10-02 PROCEDURE — 36415 COLL VENOUS BLD VENIPUNCTURE: CPT

## 2022-10-02 PROCEDURE — 85025 COMPLETE CBC W/AUTO DIFF WBC: CPT

## 2022-10-02 PROCEDURE — 74011250636 HC RX REV CODE- 250/636

## 2022-10-02 PROCEDURE — 74011250636 HC RX REV CODE- 250/636: Performed by: EMERGENCY MEDICINE

## 2022-10-02 PROCEDURE — 81001 URINALYSIS AUTO W/SCOPE: CPT

## 2022-10-02 PROCEDURE — 83690 ASSAY OF LIPASE: CPT

## 2022-10-02 RX ORDER — ONDANSETRON 4 MG/1
4 TABLET, FILM COATED ORAL
Qty: 20 TABLET | Refills: 0 | Status: SHIPPED | OUTPATIENT
Start: 2022-10-02

## 2022-10-02 RX ORDER — ONDANSETRON 2 MG/ML
INJECTION INTRAMUSCULAR; INTRAVENOUS
Status: COMPLETED
Start: 2022-10-02 | End: 2022-10-02

## 2022-10-02 RX ORDER — ONDANSETRON 2 MG/ML
4 INJECTION INTRAMUSCULAR; INTRAVENOUS
Status: COMPLETED | OUTPATIENT
Start: 2022-10-02 | End: 2022-10-02

## 2022-10-02 RX ADMIN — ONDANSETRON 4 MG: 2 INJECTION INTRAMUSCULAR; INTRAVENOUS at 21:34

## 2022-10-02 RX ADMIN — ONDANSETRON HYDROCHLORIDE 4 MG: 2 INJECTION, SOLUTION INTRAMUSCULAR; INTRAVENOUS at 21:34

## 2022-10-02 RX ADMIN — SODIUM CHLORIDE 1000 ML: 9 INJECTION, SOLUTION INTRAVENOUS at 21:47

## 2022-10-02 RX ADMIN — SODIUM CHLORIDE 1000 ML: 9 INJECTION, SOLUTION INTRAVENOUS at 20:05

## 2022-10-02 NOTE — ED PROVIDER NOTES
EMERGENCY DEPARTMENT HISTORY AND PHYSICAL EXAM      Date: 10/2/2022  Patient Name: Meliza Jordan    History of Presenting Illness     Chief Complaint   Patient presents with    Dehydration     Chemo/radiation patient, sent my oncology for hydration. HPI: Meliza Jordan, 62 y.o. male history of tonsillar cancer status post G-tube, status post chemotherapy and radiation, presenting to ED with chief complaint of generalized fatigue, nausea, vomiting, decreased p.o. intake. Several days of symptoms. He states he feels very dehydrated. States has had significantly decreased urine output. He contacted his oncologist and was advised to go to the ED for rehydration. Patient denies abdominal pain or diarrhea. He denies respiratory symptoms. States he had labs done on Friday and his creatinine had increased. We do not have records on file. There are no other complaints, changes, or physical findings at this time. PCP: Sherman Patrick MD    No current facility-administered medications on file prior to encounter. Current Outpatient Medications on File Prior to Encounter   Medication Sig Dispense Refill    escitalopram oxalate (LEXAPRO) 10 mg tablet Take 10 mg by mouth daily. Past History     Past Medical History:  Past Medical History:   Diagnosis Date    Cancer ([facility] [address]     Tonsil and neck    Chronic pain     Kidney stone     Psychiatric disorder     anxiety    Sleep apnea     no longer uses CPAP       Past Surgical History:  Past Surgical History:   Procedure Laterality Date    HX HEENT      LASIK    HX HIP REPLACEMENT      HX KNEE ARTHROSCOPY Right     HX VASECTOMY         Family History:  No family history on file.     Social History:  Social History     Tobacco Use    Smoking status: Every Day     Packs/day: 0.50     Types: Cigarettes    Smokeless tobacco: Never   Vaping Use    Vaping Use: Never used   Substance Use Topics    Alcohol use: Not Currently     Comment: 12 beers per week Neurosurgery Progress Note    Subjective:  No event    Exam:  Neuro intact and stable  No vision issues     BP  Min: 108/60  Max: 177/93  Pulse  Av.8  Min: 71  Max: 98  Resp  Av.9  Min: 9  Max: 50  Temp  Av.1 °C (96.9 °F)  Min: 35.9 °C (96.6 °F)  Max: 36.2 °C (97.2 °F)  Monitored Temp 2  Av °C (98.6 °F)  Min: 36.2 °C (97.2 °F)  Max: 37.4 °C (99.3 °F)  SpO2  Av.4 %  Min: 86 %  Max: 100 %    No data recorded    Recent Labs     22  0413   WBC 11.4*   RBC 4.56   HEMOGLOBIN 12.9   HEMATOCRIT 37.7   MCV 82.7   MCH 28.3   MCHC 34.2   RDW 50.3*   PLATELETCT 299   MPV 10.7     Recent Labs     22  0413   SODIUM 137   POTASSIUM 4.1   CHLORIDE 104   CO2 20   GLUCOSE 149*   BUN 13   CREATININE 1.11   CALCIUM 8.9     Recent Labs     22  0545   APTT 31.5   INR 1.16*           Intake/Output                         22 0700 - 22 0659 22 0700 - 22 0659     [id number] 0827-1798 Total 8390-2012 0667-5795 Total                 Intake    P.O.  250  -- 250  --  -- --    P.O. 250 -- 250 -- -- --    I.V.  1255  500 1755  --  -- --    Volume (mL) (Lactated Ringers) 800 -- 800 -- -- --    Volume (mL) (0.9 % NaCl with KCl 20 mEq infusion) 455 500 955 -- -- --    IV Piggyback  100  100 200  --  -- --    Volume (mL) (ceFAZolin in dextrose (Ancef) IVPB premix 2 g) 100 100 200 -- -- --    Total Intake 2074 317 2631 -- -- --       Output    Urine  1525  900 2425  --  -- --    Output (mL) (Urethral Catheter Non-latex;Temperature probe 16 Fr.) 5997 936 2271 -- -- --    Blood  50  -- 50  --  -- --    Est. Blood Loss 50 -- 50 -- -- --    Total Output 6088 559 6707 -- -- --       Net I/O     30 -300 -270 -- -- --              Intake/Output Summary (Last 24 hours) at 2022 0821  Last data filed at 2022 0600  Gross per 24 hour   Intake 2205 ml   Output 2475 ml   Net -270 ml             levothyroxine  50 mcg AM ES    fluticasone  2 Puff BID (RT)    Pharmacy Consult Request  1 Each  Drug use: No       Allergies: Allergies   Allergen Reactions    Emend [Aprepitant] Anaphylaxis         Review of Systems   Review of Systems   Constitutional:  Positive for fatigue. Negative for chills and fever. HENT:  Negative for sore throat. Eyes:  Negative for redness. Respiratory:  Negative for shortness of breath. Cardiovascular:  Negative for chest pain. Gastrointestinal:  Positive for nausea and vomiting. Negative for abdominal pain and diarrhea. Genitourinary:  Negative for dysuria. Musculoskeletal:  Negative for back pain. Neurological:  Negative for syncope. Psychiatric/Behavioral:  The patient is not nervous/anxious. All other systems reviewed and are negative. Physical Exam   Physical Exam  Vitals and nursing note reviewed. Constitutional:       Comments: Frail   HENT:      Head: Normocephalic and atraumatic. Mouth/Throat:      Mouth: Mucous membranes are dry. Cardiovascular:      Rate and Rhythm: Normal rate and regular rhythm. Pulmonary:      Effort: Pulmonary effort is normal.      Breath sounds: Normal breath sounds. Abdominal:      Palpations: Abdomen is soft. Tenderness: There is no abdominal tenderness. Musculoskeletal:         General: No deformity. Cervical back: Neck supple. Skin:     General: Skin is warm and dry. Neurological:      General: No focal deficit present. Mental Status: He is alert. Psychiatric:         Mood and Affect: Mood normal.         Behavior: Behavior normal.       Diagnostic Study Results     Labs -   No results found for this or any previous visit (from the past 24 hour(s)). Radiologic Studies -   No orders to display     CT Results  (Last 48 hours)      None          CXR Results  (Last 48 hours)      None              Medical Decision Making   I am the first provider for this patient.     I reviewed the vital signs, available nursing notes, past medical history, past surgical history, family history and PHARMACY TO DOSE    MD DEL TORO...DO NOT ADMINISTER NSAIDS or ASPIRIN unless ORDERED By Neurosurgery  1 Each PRN    ondansetron  4 mg Q4HRS PRN    dexamethasone  4 mg Once PRN    diphenhydrAMINE  25 mg Q6HRS PRN    scopolamine  1 Patch Q72HRS PRN    docusate sodium  100 mg BID    senna-docusate  1 Tablet Q24HRS PRN    bisacodyl  10 mg Q24HRS PRN    sodium phosphate  1 Each Once PRN    0.9 % NaCl with KCl 20 mEq 1,000 mL   Continuous    acetaminophen  1,000 mg Q8HRS    oxyCODONE immediate-release  5 mg Q3HRS PRN    Or    oxyCODONE immediate-release  10 mg Q3HRS PRN    Or    HYDROmorphone  0.5 mg Q3HRS PRN    acetaminophen  650 mg Q6HRS PRN    senna-docusate  2 Tablet BID    And    polyethylene glycol/lytes  1 Packet QDAY PRN    And    magnesium hydroxide  30 mL QDAY PRN    And    bisacodyl  10 mg QDAY PRN    hydrALAZINE  10-20 mg Q HOUR PRN    labetalol  10-20 mg Q HOUR PRN       Assessment and Plan:  Hospital day # 1  POD# 1  Chemical prophylactic DVT therapy: No  Start date/time: tbd    Post op Ct head no concerns  No CSF leak   Am labs pending   BP better this am   O2 sat slightly down likely baseline given habitus   Ok to DC barragan   Ok to TTF and Q4hr neuro checks    Roberto                     social history. Vital Signs-Reviewed the patient's vital signs. Patient Vitals for the past 24 hrs:   Temp Pulse Resp BP SpO2   10/02/22 1839 98.7 °F (37.1 °C) 100 20 123/87 97 %         Provider Notes (Medical Decision Making):   Pleasant, well-appearing 81-year-old with history of cancer currently on chemotherapy and radiation presenting to ED with chief complaint of nausea, vomiting, generalized weakness. He states he feels dehydrated. Looks dehydrated clinically. He was given IV fluids in the ED and felt much better. He vomited, was given Zofran, and subsequently felt better, p.o. challenged successfully. Ultimately, he wants to go home and I think this is reasonable. Plan to follow-up with his physician for further evaluation. Return precautions given. ED Course:     Initial assessment performed. The patients presenting problems have been discussed, and they are in agreement with the care plan formulated and outlined with them. I have encouraged them to ask questions as they arise throughout their visit. Disposition:  dc    PLAN:  1. Current Discharge Medication List        2.    Follow-up Information    None       Return to ED if worse     Diagnosis     Clinical Impression: dehydration

## 2022-11-08 ENCOUNTER — TRANSCRIBE ORDER (OUTPATIENT)
Dept: SCHEDULING | Age: 62
End: 2022-11-08

## 2022-11-08 DIAGNOSIS — C09.9 MALIGNANT NEOPLASM OF TONSIL (HCC): Primary | ICD-10-CM

## 2022-11-20 ENCOUNTER — HOSPITAL ENCOUNTER (OUTPATIENT)
Dept: CT IMAGING | Age: 62
Discharge: HOME | End: 2022-11-20
Attending: RADIOLOGY
Payer: COMMERCIAL

## 2022-11-20 DIAGNOSIS — C09.9 MALIGNANT NEOPLASM OF TONSIL (HCC): ICD-10-CM

## 2022-11-20 PROCEDURE — 70491 CT SOFT TISSUE NECK W/DYE: CPT

## 2022-11-20 PROCEDURE — 74011000636 HC RX REV CODE- 636: Performed by: RADIOLOGY

## 2022-11-20 RX ADMIN — IOPAMIDOL 100 ML: 755 INJECTION, SOLUTION INTRAVENOUS at 08:58

## 2022-11-25 ENCOUNTER — TRANSCRIBE ORDER (OUTPATIENT)
Dept: SCHEDULING | Age: 62
End: 2022-11-25

## 2022-11-25 DIAGNOSIS — Z00.8 OTHER SPECIFIED GENERAL MEDICAL EXAMINATION: ICD-10-CM

## 2022-11-25 DIAGNOSIS — C09.0: Primary | ICD-10-CM

## 2022-11-25 DIAGNOSIS — Z78.9 NONSMOKER: ICD-10-CM

## 2023-01-12 ENCOUNTER — HOSPITAL ENCOUNTER (OUTPATIENT)
Dept: PET IMAGING | Age: 63
End: 2023-01-12
Attending: OTOLARYNGOLOGY
Payer: COMMERCIAL

## 2023-01-12 ENCOUNTER — HOSPITAL ENCOUNTER (OUTPATIENT)
Dept: PET IMAGING | Age: 63
Discharge: HOME | End: 2023-01-12
Attending: OTOLARYNGOLOGY
Payer: COMMERCIAL

## 2023-01-12 VITALS — BODY MASS INDEX: 25.33 KG/M2 | WEIGHT: 171 LBS | HEIGHT: 69 IN

## 2023-01-12 LAB
GLUCOSE BLD STRIP.AUTO-MCNC: 108 MG/DL (ref 65–117)
SERVICE CMNT-IMP: NORMAL

## 2023-01-12 PROCEDURE — A9552 F18 FDG: HCPCS

## 2023-01-12 RX ORDER — FLUDEOXYGLUCOSE F-18 200 MCI/ML
10 INJECTION INTRAVENOUS ONCE
Status: COMPLETED | OUTPATIENT
Start: 2023-01-12 | End: 2023-01-12

## 2023-01-12 RX ADMIN — FLUDEOXYGLUCOSE F-18 9.95 MILLICURIE: 200 INJECTION INTRAVENOUS at 08:12

## 2023-04-20 ENCOUNTER — HOSPITAL ENCOUNTER (OUTPATIENT)
Dept: ULTRASOUND IMAGING | Age: 63
Discharge: HOME | End: 2023-04-20
Attending: INTERNAL MEDICINE
Payer: COMMERCIAL

## 2023-04-20 ENCOUNTER — HOSPITAL ENCOUNTER (OUTPATIENT)
Dept: MAMMOGRAPHY | Age: 63
End: 2023-04-20
Attending: INTERNAL MEDICINE
Payer: COMMERCIAL

## 2023-04-20 DIAGNOSIS — N63.20 MASS OF LEFT BREAST: ICD-10-CM

## 2023-04-20 DIAGNOSIS — N63.20 LUMP OF LEFT BREAST: ICD-10-CM

## 2023-04-20 DIAGNOSIS — Z51.11 ENCOUNTER FOR ANTINEOPLASTIC CHEMOTHERAPY: ICD-10-CM

## 2023-04-20 DIAGNOSIS — C09.9 MALIGNANT NEOPLASM OF TONSIL (HCC): ICD-10-CM

## 2023-04-20 PROCEDURE — 77066 DX MAMMO INCL CAD BI: CPT

## 2023-05-17 NOTE — PERIOP NOTES
Hibiclens/Chlorhexidine    Preventing Infections Before and After  Your Surgery    IMPORTANT INSTRUCTIONS    Please read and follow these instructions carefully. If you are unable to comply with the below instructions your procedure will be cancelled. Every Night for Three (3) nights before your surgery:  1. Shower with an antibacterial soap, such as Dial, or the soap provided at your preassessment appointment. A shower is better than a bath for cleaning your skin. 2. If needed, ask someone to help you reach all areas of your body. Dont forget to clean your belly button with every shower. The night before your surgery: If you lose your Hibiclens/chlorhexidine please contact surgery center or you can purchase it at a local pharmacy  1. On the night before your surgery, shower with an antibacterial soap, such as Dial, or the soap provided at your preassessment appointment. 2. With one packet of Hibiclens/Chlorhexidine in hand, turn water off.  3. Apply Hibiclens antiseptic skin cleanser with a clean, freshly washed washcloth. ? Gently apply to your body from chin to toes (except the genital area) and especially the area(s) where your incision(s) will be. ? Leave Hibiclens/Chlorhexidine on your skin for at least 20 seconds. CAUTION: If needed, Hibiclens/chlorhexidine may be used to clean the folds of skin of the legs (such as in the area of the groin) and on your buttocks and hips. However, do not use Hibiclens/Chlorhexidine above the neck or in the genital area (your bottom) or put inside any area of your body. 4. Turn the water back on and rinse. 5. Dry gently with a clean, freshly washed towel. 6. After your shower, do not use any powder, deodorant, perfumes or lotion. 7. Use clean, freshly washed towels and washcloths every time you shower. 8. Wear clean, freshly washed pajamas to bed the night before surgery. 9. Sleep on clean, freshly washed sheets.   10. Do not allow pets to sleep in your bed with you. The Morning of your surgery:  1. Shower again thoroughly with an antibacterial soap, such as Dial or the soap provided at your preassessment appointment. If needed, ask someone for help to reach all areas of your body. Dont forget to clean your belly button! Rinse. 2. Dry gently with a clean, freshly washed towel. 3. After your shower, do not use any powder, deodorant, perfumes or lotion prior to surgery. 4. Put on clean, freshly washed clothing. Tips to help prevent infections after your surgery:  1. Protect your surgical wound from germs:  ? Hand washing is the most important thing you and your caregivers can do to prevent infections. ? Keep your bandage clean and dry! ? Do not touch your surgical wound. 2. Use clean, freshly washed towels and washcloths every time you shower; do not share bath linens with others. 3. Until your surgical wound is healed, wear clothing and sleep on bed linens each day that are clean and freshly washed. 4. Do not allow pets to sleep in your bed with you or touch your surgical wound. 5. Do not smoke  smoking delays wound healing. This may be a good time to stop smoking. 6. If you have diabetes, it is important for you to manage your blood sugar levels properly before your surgery as well as after your surgery. Poorly managed blood sugar levels slow down wound healing and prevent you from healing completely. If you lose your Hibiclens/chlorhexidine, please call the [facility], or it is available for purchase at your pharmacy.                ___________________      ___________________      ________________  (Signature of Patient)          (Witness)                   (Date and Time) No

## 2023-07-28 ENCOUNTER — TELEPHONE (OUTPATIENT)
Age: 63
End: 2023-07-28

## 2023-07-28 NOTE — TELEPHONE ENCOUNTER
LM to schedule consult from faxed referral;    Per jackie please book for 8.8.23 @ 11:40    NP left gynecomastia, roseanne elizabeth, notes in folder [phone], per jackie

## 2023-08-01 ENCOUNTER — TELEPHONE (OUTPATIENT)
Age: 63
End: 2023-08-01

## 2023-08-08 NOTE — TELEPHONE ENCOUNTER
LM to schedule consult from faxed referral in [facility]. Called 3x. Shredding faxed referral. Closing referral in the [facility] [address]. NP left gynecomastia, roseanne elizabeth, notes in drawer 8. 1.23 per [facility]

## 2024-10-01 ENCOUNTER — HOSPITAL ENCOUNTER (OUTPATIENT)
Facility: HOSPITAL | Age: 64
Discharge: HOME | End: 2024-10-04
Attending: OTOLARYNGOLOGY
Payer: COMMERCIAL

## 2024-10-01 DIAGNOSIS — C09.9 MALIGNANT NEOPLASM OF TONSIL (HCC): ICD-10-CM

## 2024-10-01 DIAGNOSIS — F17.200 SMOKER: ICD-10-CM

## 2024-10-01 PROCEDURE — 6360000004 HC RX CONTRAST MEDICATION: Performed by: OTOLARYNGOLOGY

## 2024-10-01 PROCEDURE — 70491 CT SOFT TISSUE NECK W/DYE: CPT

## 2024-10-01 RX ORDER — IOPAMIDOL 755 MG/ML
100 INJECTION, SOLUTION INTRAVASCULAR
Status: COMPLETED | OUTPATIENT
Start: 2024-10-01 | End: 2024-10-01

## 2024-10-01 RX ADMIN — IOPAMIDOL 100 ML: 755 INJECTION, SOLUTION INTRAVENOUS at 06:44

## (undated) DEVICE — 3M™ IOBAN™ 2 ANTIMICROBIAL INCISE DRAPE 6651EZ: Brand: IOBAN™ 2

## (undated) DEVICE — 450 ML BOTTLE OF 0.05% CHLORHEXIDINE GLUCONATE IN 99.95% STERILE WATER FOR IRRIGATION, USP AND APPLICATOR.: Brand: IRRISEPT ANTIMICROBIAL WOUND LAVAGE

## (undated) DEVICE — YANKAUER,SMOOTH HANDLE,HIGH CAPACITY: Brand: MEDLINE INDUSTRIES, INC.

## (undated) DEVICE — CANNULA CUSH AD W/ 14FT TBG

## (undated) DEVICE — SOLIDIFIER FLD 2OZ 1500CC N DISINF IN BTL DISP SAFESORB

## (undated) DEVICE — 3M™ TEGADERM™ TRANSPARENT FILM DRESSING FRAME STYLE, 1626W, 4 IN X 4-3/4 IN (10 CM X 12 CM), 50/CT 4CT/CASE: Brand: 3M™ TEGADERM™

## (undated) DEVICE — Device

## (undated) DEVICE — GOWN,SIRUS,NONRNF,XLN/2XL,18/CS: Brand: MEDLINE

## (undated) DEVICE — SOLUTION IRRIG 1000ML STRL H2O USP PLAS POUR BTL

## (undated) DEVICE — SUTURE VCRL SZ 1 L36IN ABSRB UD L36MM CT-1 1/2 CIR J947H

## (undated) DEVICE — SUTURE MCRYL SZ 3-0 L27IN ABSRB UD L24MM PS-1 3/8 CIR PRIM Y936H

## (undated) DEVICE — GLOVE SURG SZ 85 L12IN FNGR ORTHO 126MIL CRM LTX FREE

## (undated) DEVICE — BLUNTFILL: Brand: MONOJECT

## (undated) DEVICE — TRANSFER SET 3": Brand: MEDLINE INDUSTRIES, INC.

## (undated) DEVICE — STERILE LATEX POWDER-FREE SURGICAL GLOVESWITH NITRILE COATING: Brand: PROTEXIS

## (undated) DEVICE — SHEET, DRAPE, SPLIT, STERILE: Brand: MEDLINE

## (undated) DEVICE — STRIP,CLOSURE,WOUND,MEDI-STRIP,1/2X4: Brand: MEDLINE

## (undated) DEVICE — CATH IV AUTOGRD BC BLU 22GA 25 -- INSYTE

## (undated) DEVICE — Device: Brand: JELCO

## (undated) DEVICE — BLUNTFILL WITH FILTER: Brand: MONOJECT

## (undated) DEVICE — HOOD: Brand: FLYTE

## (undated) DEVICE — STRYKER PERFORMANCE SERIES SAGITTAL BLADE: Brand: STRYKER PERFORMANCE SERIES

## (undated) DEVICE — PAD,ABDOMINAL,5"X9",ST,LF,25/BX: Brand: MEDLINE INDUSTRIES, INC.

## (undated) DEVICE — SET ADMIN 16ML TBNG L100IN 2 Y INJ SITE IV PIGGY BK DISP

## (undated) DEVICE — SUTURE VCRL SZ 2-0 L36IN ABSRB UD L36MM CT-1 1/2 CIR J945H

## (undated) DEVICE — CATH KT GASTMY PEG PSH 24FR --

## (undated) DEVICE — BASIN EMSIS 16OZ GRAPHITE PLAS KID SHP MOLD GRAD FOR ORAL

## (undated) DEVICE — DRAPE,U/ SHT,SPLIT,PLAS,STERIL: Brand: MEDLINE

## (undated) DEVICE — ELECTRODE,RADIOTRANSLUCENT,FOAM,3PK: Brand: MEDLINE

## (undated) DEVICE — KIT COLON W/ 1.1OZ LUB AND 2 END

## (undated) DEVICE — BAG BELONG PT PERS CLEAR HANDL

## (undated) DEVICE — GLOVE SURG SZ 85 L12IN FNGR THK79MIL GRN LTX FREE

## (undated) DEVICE — SOLUTION IRRIG 1000ML 0.9% SOD CHL USP POUR PLAS BTL

## (undated) DEVICE — TOTAL JOINT-MRMC: Brand: MEDLINE INDUSTRIES, INC.

## (undated) DEVICE — BLADE ELECTRODE: Brand: EDGE

## (undated) DEVICE — DERMABOND SKIN ADH 0.7ML -- DERMABOND ADVANCED 12/BX

## (undated) DEVICE — SNAP KOVER: Brand: UNBRANDED

## (undated) DEVICE — SYR 3ML LL TIP 1/10ML GRAD --

## (undated) DEVICE — 1200 GUARD II KIT W/5MM TUBE W/O VAC TUBE: Brand: GUARDIAN

## (undated) DEVICE — SYR 5ML 1/5 GRAD LL NSAF LF --

## (undated) DEVICE — SOLUTION SURG PREP 26 CC PURPREP

## (undated) DEVICE — BITEBLOCK ENDOSCP 60FR MAXI WHT POLYETH STURDY W/ VELC WVN

## (undated) DEVICE — BRUSH SCRB 4% CHG RED DISP --

## (undated) DEVICE — KIT POS FOAM HANA TBL